# Patient Record
Sex: FEMALE | Race: WHITE | NOT HISPANIC OR LATINO | Employment: FULL TIME | URBAN - METROPOLITAN AREA
[De-identification: names, ages, dates, MRNs, and addresses within clinical notes are randomized per-mention and may not be internally consistent; named-entity substitution may affect disease eponyms.]

---

## 2018-05-01 ENCOUNTER — OFFICE VISIT (OUTPATIENT)
Dept: OBSTETRICS AND GYNECOLOGY | Facility: CLINIC | Age: 38
End: 2018-05-01

## 2018-05-01 VITALS
BODY MASS INDEX: 23.56 KG/M2 | WEIGHT: 138 LBS | DIASTOLIC BLOOD PRESSURE: 70 MMHG | SYSTOLIC BLOOD PRESSURE: 121 MMHG | HEART RATE: 80 BPM | HEIGHT: 64 IN

## 2018-05-01 DIAGNOSIS — Z01.419 WELL WOMAN EXAM WITH ROUTINE GYNECOLOGICAL EXAM: Primary | ICD-10-CM

## 2018-05-01 PROCEDURE — 99395 PREV VISIT EST AGE 18-39: CPT | Performed by: OBSTETRICS & GYNECOLOGY

## 2018-05-01 RX ORDER — SELENIUM SULFIDE 2.5 MG/100ML
LOTION TOPICAL
COMMUNITY
Start: 2017-12-08 | End: 2018-09-12

## 2018-05-01 RX ORDER — CICLOPIROX 1 G/100ML
SHAMPOO TOPICAL
COMMUNITY
Start: 2018-02-28 | End: 2018-09-12

## 2018-05-01 RX ORDER — LEVOTHYROXINE SODIUM 0.2 MG/1
200 TABLET ORAL
COMMUNITY
End: 2018-09-12

## 2018-05-01 RX ORDER — FLUOCINONIDE TOPICAL SOLUTION USP, 0.05% 0.5 MG/ML
SOLUTION TOPICAL
COMMUNITY
Start: 2018-02-06 | End: 2018-09-12

## 2018-05-01 RX ORDER — NORETHINDRONE ACETATE AND ETHINYL ESTRADIOL 1; .02 MG/1; MG/1
TABLET ORAL
Qty: 21 TABLET | Refills: 12 | Status: SHIPPED | OUTPATIENT
Start: 2018-05-01 | End: 2018-09-12

## 2018-05-01 NOTE — PROGRESS NOTES
Subjective   Danya Rocha is a 37 y.o. female   Chief Complaint   Patient presents with   • Annual Exam     last pap 16      History of Present Illness  Danya Rocha is a 37 y.o.  who presents for an annual examination.   Periods are getting shorter - heavy for 3 days, bleeds for a total of 6 days.  21 days between menses.  Used to be more (28 days).  She had an ultrasound and found a  Polyp.  Was placed on oral contraceptive pills.  Has not had hysteroscopy, D&C.  She stopped the pills because it was harder to remember.      Pap history:  Last pap: 2016 LSIL, HPV positive; no ODESSA noted  Prior abnormal paps: yes, denies h/o LEEP; has had colposcopy 3-4 times    STDs  Sexually active: yes  History of STDs: HPV  Contraception:  S/p BTL    History of physical abuse: no, History of sexual abuse: no and History of verbal/emotional abuse: no    Screening for BRCA-   Is patient's family history significant for any of the following?   no  For non-Ashkenazi Mu-ism women:   Two first-degree relatives with breast cancer, one of whom was diagnosed at age 50 or younger   A combination of three or more first or second-degree relatives with breast cancer regardless of age at diagnosis   A combination of both breast and ovarian cancer among first and second-degree relatives   A first-degree relative with bilateral breast cancer   A combination of two or more first or second degree relatives with ovarian cancer, regardless of age at diagnosis   A first or second-degree relative with both breast and ovarian cancer at any age   History of breast cancer in a male relative   For women of Ashkenazi Mu-ism descent:   Any first-degree relative (or two second degree relatives on the same side of the family) with breast or ovarian cancer   Recommendations from the [place] Preventive Services Task Force on who should be offered genetic testing for BRCA mutations*     Past Medical History:   Diagnosis Date   •  "Hypothyroid    • Lymphangioma      Past Surgical History:   Procedure Laterality Date   • BREAST SURGERY     • NECK SURGERY     • TONGUE SURGERY     • TUBAL ABDOMINAL LIGATION       Social History   Substance Use Topics   • Smoking status: Never Smoker   • Smokeless tobacco: Never Used   • Alcohol use Yes      Comment: social     Family History   Problem Relation Age of Onset   • Breast cancer Neg Hx    • Ovarian cancer Neg Hx    • Uterine cancer Neg Hx    • Colon cancer Neg Hx      Current Outpatient Prescriptions on File Prior to Visit   Medication Sig Dispense Refill   • SYNTHROID 175 MCG tablet        No current facility-administered medications on file prior to visit.      Allergies   Allergen Reactions   • Latex Irritability            Review of Systems   Constitutional: Negative for chills, fever and unexpected weight change.   HENT: Negative for congestion, nosebleeds and sore throat.    Eyes: Negative for visual disturbance.   Respiratory: Negative for cough, chest tightness and shortness of breath.    Cardiovascular: Negative for chest pain and palpitations.   Gastrointestinal: Negative for abdominal pain, constipation, diarrhea, nausea and vomiting.   Endocrine: Negative for polyuria.   Genitourinary: Negative for difficulty urinating, dyspareunia, dysuria, frequency, genital sores, hematuria, menstrual problem, pelvic pain, urgency, vaginal bleeding, vaginal discharge and vaginal pain.   Musculoskeletal: Negative for arthralgias and joint swelling.   Skin: Negative for color change and rash.   Neurological: Negative for dizziness, light-headedness and headaches.   Hematological: Does not bruise/bleed easily.   Psychiatric/Behavioral: Negative for dysphoric mood. The patient is not nervous/anxious.        Objective    /70   Pulse 80   Ht 162.6 cm (64.02\")   Wt 62.6 kg (138 lb)   LMP 04/12/2018 (Exact Date)   BMI 23.68 kg/m²    Physical Exam   Constitutional: She is oriented to person, place, " and time. She appears well-developed and well-nourished. No distress.   HENT:   Head: Normocephalic and atraumatic.   Neck: No tracheal deviation present. No thyromegaly present.   Cardiovascular: Normal rate, regular rhythm and normal heart sounds.  Exam reveals no gallop and no friction rub.    No murmur heard.  Pulmonary/Chest: Effort normal and breath sounds normal. No respiratory distress. She has no wheezes. She has no rales. She exhibits no tenderness. Right breast exhibits no inverted nipple, no mass, no nipple discharge, no skin change and no tenderness. Left breast exhibits no inverted nipple, no mass, no nipple discharge, no skin change and no tenderness.   Abdominal: Soft. She exhibits no distension and no mass. There is no tenderness.   Genitourinary: Uterus normal. No labial fusion. There is no rash, lesion or injury on the right labia. There is no rash, lesion or injury on the left labia. Uterus is not deviated, not enlarged, not fixed and not tender. Cervix exhibits no motion tenderness, no discharge and no friability. Right adnexum displays no mass, no tenderness and no fullness. Left adnexum displays no mass, no tenderness and no fullness. No tenderness or bleeding in the vagina. No vaginal discharge found.   Musculoskeletal: Normal range of motion. She exhibits no edema or deformity.   Lymphadenopathy:     She has no cervical adenopathy.   Neurological: She is alert and oriented to person, place, and time.   Skin: Skin is warm and dry. No rash noted. She is not diaphoretic.   Psychiatric: She has a normal mood and affect. Her behavior is normal. Judgment and thought content normal.         Assessment/Plan   Problems Addressed this Visit     None      Visit Diagnoses     Well woman exam with routine gynecological exam    -  Primary    Relevant Orders    IGP, Apt HPV,rfx 16 / 18,45 - ThinPrep Vial, Cervix        Well woman counseling/screening:    Cervical cancer screening:    Reports h/o cervical  dysplasia - most recent LSIL with negative colpo   The patient is due for a pap today.    Screening guidelines discussed with patient  Breast cancer screening:    Clinical breast exam recommended for age 20-39 years every 1-3 years   Mammogram recommended starting age 40, Discussed risks and benefits of earlier versus later screening   Breast self awareness encouraged  STD Screening   Declines  Contraception :   S/p BTL  Having frequent menses - q21 days- and would like to skip cycle while on vacation in June. She denies intermenstrual bleeding or heavy menses.  BP slightly elevated but this is not a diagnosis she has had in the past, repeat BP within normal limits.  No C/I to oral contraceptive pill use and has used in 2016 with Dr. Mitchell. Will try two cycles of Lo estren.  Discussed use for continuous oral contraceptive pill.    Family history    does not demonstrate need for genetics referral   Healthy lifestyle counseling:   Patient was counseled on    Body mass index is 23.68 kg/m².   AUB  Patient was counseled on possible etiologies including endometrial pathology (polyps, fibroids, hyperplasia, malignancy) as well as hormonal (ovulatory dysfunction, thyroid dysfunction, etc.)  We discussed risks of anemia if bleeding heavy and untreated, including syncope, dizziness, and lightheadedness.     For the work up of AUB, we will obtain: repeat ultrasound as she had one suggesting a polyp in the past.  She is interested in Mirena placement, signed paperwork today. Discussed ablation, however patient is worried about failure given age (only 37)

## 2018-05-08 ENCOUNTER — TELEPHONE (OUTPATIENT)
Dept: OBSTETRICS AND GYNECOLOGY | Facility: CLINIC | Age: 38
End: 2018-05-08

## 2018-05-08 LAB
CYTOLOGIST CVX/VAG CYTO: ABNORMAL
CYTOLOGY CVX/VAG DOC THIN PREP: ABNORMAL
DX ICD CODE: ABNORMAL
DX ICD CODE: ABNORMAL
HIV 1 & 2 AB SER-IMP: ABNORMAL
HPV I/H RISK 4 DNA CVX QL PROBE+SIG AMP: POSITIVE
HPV16 DNA CVX QL PROBE+SIG AMP: NEGATIVE
HPV18+45 E6+E7 MRNA CVX QL NAA+PROBE: NEGATIVE
OTHER STN SPEC: ABNORMAL
PATH REPORT.FINAL DX SPEC: ABNORMAL
PATHOLOGIST CVX/VAG CYTO: ABNORMAL
STAT OF ADQ CVX/VAG CYTO-IMP: ABNORMAL

## 2018-05-08 NOTE — TELEPHONE ENCOUNTER
Pap May 2018: LSIL, HR HPV +, 16/18 negative  Last pap: Sept 2016 LSIL, HPV positive; no ODESSA noted  Prior abnormal paps: yes, denies h/o LEEP; has had colposcopy 3-4 times    Please call patient and inform her of LSIL HPV + pap, recommend colposcopy. Thanks!

## 2018-05-09 NOTE — TELEPHONE ENCOUNTER
Called patient to inform results, no answer. Left a message to return call.     Patient return call, patient informed of results and scheduled a colpo appt for 5/23/18

## 2018-05-22 ENCOUNTER — PROCEDURE VISIT (OUTPATIENT)
Dept: OBSTETRICS AND GYNECOLOGY | Facility: CLINIC | Age: 38
End: 2018-05-22

## 2018-05-22 VITALS
WEIGHT: 138 LBS | HEART RATE: 79 BPM | HEIGHT: 64 IN | SYSTOLIC BLOOD PRESSURE: 127 MMHG | BODY MASS INDEX: 23.56 KG/M2 | DIASTOLIC BLOOD PRESSURE: 77 MMHG

## 2018-05-22 DIAGNOSIS — R87.612 LOW GRADE SQUAMOUS INTRAEPITHELIAL LESION ON CYTOLOGIC SMEAR OF CERVIX (LGSIL): Primary | ICD-10-CM

## 2018-05-22 LAB
B-HCG UR QL: NEGATIVE
INTERNAL NEGATIVE CONTROL: NEGATIVE
INTERNAL POSITIVE CONTROL: POSITIVE
Lab: NORMAL

## 2018-05-22 PROCEDURE — 57454 BX/CURETT OF CERVIX W/SCOPE: CPT | Performed by: OBSTETRICS & GYNECOLOGY

## 2018-05-22 PROCEDURE — 81025 URINE PREGNANCY TEST: CPT | Performed by: OBSTETRICS & GYNECOLOGY

## 2018-05-22 RX ORDER — CLOBETASOL PROPIONATE 0.46 MG/ML
SOLUTION TOPICAL
COMMUNITY
Start: 2018-05-03 | End: 2018-09-12

## 2018-05-25 LAB
DX ICD CODE: NORMAL
DX ICD CODE: NORMAL
PATH REPORT.FINAL DX SPEC: NORMAL
PATH REPORT.GROSS SPEC: NORMAL
PATH REPORT.SITE OF ORIGIN SPEC: NORMAL
PATHOLOGIST NAME: NORMAL
PAYMENT PROCEDURE: NORMAL

## 2018-05-29 ENCOUNTER — TELEPHONE (OUTPATIENT)
Dept: OBSTETRICS AND GYNECOLOGY | Facility: CLINIC | Age: 38
End: 2018-05-29

## 2018-05-29 NOTE — TELEPHONE ENCOUNTER
Called patient to review colposcopy results.  Discussed with patient that the colposcopy showed ODESSA 2 and would recommend excisional procedure such as LEEP or CKC.  Patient expressed understanding. She is going on a vacation the next two months but will call to come for an appointment on returning. Would recommend follow up in the next 6 to 8 weeks at longest. Patient expressed understanding.  She is considering LEEP/CKC in OR versus office.  Discussed procedure/risk/benefits.

## 2018-09-12 ENCOUNTER — CONSULT (OUTPATIENT)
Dept: OBSTETRICS AND GYNECOLOGY | Facility: CLINIC | Age: 38
End: 2018-09-12

## 2018-09-12 VITALS
BODY MASS INDEX: 24.24 KG/M2 | HEIGHT: 64 IN | SYSTOLIC BLOOD PRESSURE: 125 MMHG | WEIGHT: 142 LBS | HEART RATE: 87 BPM | DIASTOLIC BLOOD PRESSURE: 85 MMHG

## 2018-09-12 DIAGNOSIS — N87.1 DYSPLASIA OF CERVIX, HIGH GRADE CIN 2: Primary | ICD-10-CM

## 2018-09-12 DIAGNOSIS — R92.8 ABNORMAL FINDING ON BREAST IMAGING: ICD-10-CM

## 2018-09-12 PROCEDURE — 99214 OFFICE O/P EST MOD 30 MIN: CPT | Performed by: OBSTETRICS & GYNECOLOGY

## 2018-09-13 PROBLEM — N87.1 DYSPLASIA OF CERVIX, HIGH GRADE CIN 2: Status: ACTIVE | Noted: 2018-09-13

## 2018-09-13 NOTE — PROGRESS NOTES
Akanksha Rocha is a 37 y.o. female   Chief Complaint   Patient presents with   • Consult    CC:  ODESSA 2 on colposcopy with persistence of dysplasia. Recommend excisional procedure   Left breast pain    History of Present Illness  Patient presents for follow-up of cervical dysplasia.  She was unable to go through an excisional procedure secondary to travel plans over the summer.  She is not ready for an excisional procedure.  She additionally complains of some left-sided breast pain.  Patient has a complicated medical history with multiple surgeries to her right arm secondary to lymphedema.  She has had a mammogram in the past on the right due to pain that showed lymphedema.  She reports that the left breast pain is new, not associated with menstrual cycle, feels like a pulling sensation in the upper outer quadrant of her left breast.  She denies any nipple discharge, masses, erythema.  Does report global enlargement of left breast which she attributes to recent weight gain.  Past Medical History:   Diagnosis Date   • Hypothyroid    • Lymphangioma      Past Surgical History:   Procedure Laterality Date   • BREAST SURGERY     • NECK SURGERY     • TONGUE SURGERY     • TUBAL ABDOMINAL LIGATION       Social History   Substance Use Topics   • Smoking status: Never Smoker   • Smokeless tobacco: Never Used   • Alcohol use Yes      Comment: social     Family History   Problem Relation Age of Onset   • Breast cancer Neg Hx    • Ovarian cancer Neg Hx    • Uterine cancer Neg Hx    • Colon cancer Neg Hx      Review of Systems   Constitutional: Negative for chills, fever and unexpected weight change.   HENT: Negative for congestion, nosebleeds and sore throat.    Eyes: Negative for visual disturbance.   Respiratory: Negative for cough, chest tightness and shortness of breath.    Cardiovascular: Negative for chest pain and palpitations.   Gastrointestinal: Negative for abdominal pain, constipation, diarrhea, nausea  "and vomiting.   Endocrine: Negative for polyuria.   Genitourinary: Negative for difficulty urinating, dyspareunia, dysuria, frequency, genital sores, hematuria, menstrual problem, pelvic pain, urgency, vaginal bleeding, vaginal discharge and vaginal pain.   Musculoskeletal: Negative for arthralgias and joint swelling.        Breast pain +   Skin: Negative for color change and rash.   Neurological: Negative for dizziness, light-headedness and headaches.   Hematological: Does not bruise/bleed easily.   Psychiatric/Behavioral: Negative for dysphoric mood. The patient is not nervous/anxious.        Objective    /85   Pulse 87   Ht 162.6 cm (64.02\")   Wt 64.4 kg (142 lb)   BMI 24.36 kg/m²    Physical Exam   Constitutional: She is oriented to person, place, and time. She appears well-developed and well-nourished. No distress.   HENT:   Head: Normocephalic and atraumatic.   Eyes: EOM are normal. No scleral icterus.   Pulmonary/Chest: Effort normal and breath sounds normal. Right breast exhibits skin change (from prior surgery). Right breast exhibits no inverted nipple, no mass, no nipple discharge and no tenderness. Left breast exhibits no inverted nipple, no mass, no nipple discharge, no skin change and no tenderness.       Left breast > right breast   Abdominal: There is no tenderness.   Neurological: She is alert and oriented to person, place, and time.   Skin: Skin is warm and dry. She is not diaphoretic.   Psychiatric: She has a normal mood and affect. Her behavior is normal. Judgment and thought content normal.         Assessment/Plan   Danya was seen today for consult.    Diagnoses and all orders for this visit:    Dysplasia of cervix, high grade ODESSA 2  -     Mammo Diagnostic Left With CAD  -     Case Request; Standing  -     CBC and Differential; Future  -     Case Request    Other orders  -     Follow Anesthesia Guidelines / Standing Orders; Future  -     Follow Anesthesia Guidelines / Standing " Orders; Standing  -     Obtain informed consent; Standing    Options for treatment were discussed with patient regarding management of cervical dysplasia including expectant management, excision, surveillance.  Given persistence of disease, patient would like excisional procedure.  We discussed options of loop electrosurgical excision procedure versus cold knife conization.  Patient would like to to this procedure in the operating room secondary to her concern for inability to tolerate in the office.  We discussed that Clomid conization has better margins for pathologic exam and patient would prefer to proceed with this.  As noted in exam note from May 1, patient has been having shorter menstrual cycles and is considering definitive management as she felt birth control pills.  She had an ultrasound on September 26, 2016 that showed tissue consistent with a possible polyp.  We discussed that during this procedure, we could proceed with hysteroscopy, D&C to investigate if the polyp is still there given her bleeding changes and to get a uterine sample to rule out hyperplasia.    She is understanding that this procedure involves removal of a portion of the cervix and the risks include but are not limited to risks of anesthesia, injury to surrounding structures, infection, bleeding, uterine perforation, inability to diagnose, need for other surgeries/procedures.    She was verbally consented for the procedure and had all questions answered to her apparent satisfaction upon completion of the discussion.    If she were to need blood or a blood transfusion she is accepting of this.    For the breast pain, we will proceed with diagnostic mammography given the noncyclic nature and new onset.

## 2018-09-17 ENCOUNTER — RESULTS ENCOUNTER (OUTPATIENT)
Dept: OBSTETRICS AND GYNECOLOGY | Facility: CLINIC | Age: 38
End: 2018-09-17

## 2018-09-17 DIAGNOSIS — N87.1 DYSPLASIA OF CERVIX, HIGH GRADE CIN 2: ICD-10-CM

## 2018-09-18 ENCOUNTER — RESULTS ENCOUNTER (OUTPATIENT)
Dept: OBSTETRICS AND GYNECOLOGY | Facility: CLINIC | Age: 38
End: 2018-09-18

## 2018-09-18 DIAGNOSIS — N87.1 DYSPLASIA OF CERVIX, HIGH GRADE CIN 2: ICD-10-CM

## 2018-09-20 ENCOUNTER — APPOINTMENT (OUTPATIENT)
Dept: WOMENS IMAGING | Facility: HOSPITAL | Age: 38
End: 2018-09-20

## 2018-09-20 PROCEDURE — 77066 DX MAMMO INCL CAD BI: CPT | Performed by: RADIOLOGY

## 2018-09-20 PROCEDURE — G0279 TOMOSYNTHESIS, MAMMO: HCPCS | Performed by: RADIOLOGY

## 2018-09-20 PROCEDURE — 76641 ULTRASOUND BREAST COMPLETE: CPT | Performed by: RADIOLOGY

## 2018-09-20 PROCEDURE — 77062 BREAST TOMOSYNTHESIS BI: CPT | Performed by: RADIOLOGY

## 2018-09-21 ENCOUNTER — TELEPHONE (OUTPATIENT)
Dept: OBSTETRICS AND GYNECOLOGY | Facility: CLINIC | Age: 38
End: 2018-09-21

## 2018-09-21 DIAGNOSIS — R92.8 ABNORMAL MAMMOGRAM: Primary | ICD-10-CM

## 2018-09-21 NOTE — TELEPHONE ENCOUNTER
I cannot see the order to sign it so I placed a new order    [facility]-     C called and said they did a dx ramón mammo and L breast u/s on this patient but they saw something in the R breast as well and want to do an ultrasound. I put the order in and pended it if you could sign off. Thank you!    Dar

## 2018-11-01 ENCOUNTER — APPOINTMENT (OUTPATIENT)
Dept: PREADMISSION TESTING | Facility: HOSPITAL | Age: 38
End: 2018-11-01

## 2018-11-01 VITALS
OXYGEN SATURATION: 99 % | RESPIRATION RATE: 18 BRPM | HEIGHT: 64 IN | WEIGHT: 142 LBS | TEMPERATURE: 99.3 F | HEART RATE: 92 BPM | DIASTOLIC BLOOD PRESSURE: 85 MMHG | SYSTOLIC BLOOD PRESSURE: 123 MMHG | BODY MASS INDEX: 24.24 KG/M2

## 2018-11-01 LAB
ANION GAP SERPL CALCULATED.3IONS-SCNC: 10.5 MMOL/L
BASOPHILS # BLD AUTO: 0.02 10*3/MM3 (ref 0–0.2)
BASOPHILS NFR BLD AUTO: 0.3 % (ref 0–1.5)
BUN BLD-MCNC: 9 MG/DL (ref 6–20)
BUN/CREAT SERPL: 13.2 (ref 7–25)
CALCIUM SPEC-SCNC: 9.2 MG/DL (ref 8.6–10.5)
CHLORIDE SERPL-SCNC: 100 MMOL/L (ref 98–107)
CO2 SERPL-SCNC: 27.5 MMOL/L (ref 22–29)
CREAT BLD-MCNC: 0.68 MG/DL (ref 0.57–1)
DEPRECATED RDW RBC AUTO: 38 FL (ref 37–54)
EOSINOPHIL # BLD AUTO: 0.11 10*3/MM3 (ref 0–0.7)
EOSINOPHIL NFR BLD AUTO: 1.9 % (ref 0.3–6.2)
ERYTHROCYTE [DISTWIDTH] IN BLOOD BY AUTOMATED COUNT: 11.9 % (ref 11.7–13)
GFR SERPL CREATININE-BSD FRML MDRD: 96 ML/MIN/1.73
GLUCOSE BLD-MCNC: 115 MG/DL (ref 65–99)
HCG SERPL QL: NEGATIVE
HCT VFR BLD AUTO: 40.7 % (ref 35.6–45.5)
HGB BLD-MCNC: 13.8 G/DL (ref 11.9–15.5)
IMM GRANULOCYTES # BLD: 0.01 10*3/MM3 (ref 0–0.03)
IMM GRANULOCYTES NFR BLD: 0.2 % (ref 0–0.5)
LYMPHOCYTES # BLD AUTO: 0.95 10*3/MM3 (ref 0.9–4.8)
LYMPHOCYTES NFR BLD AUTO: 16.2 % (ref 19.6–45.3)
MCH RBC QN AUTO: 29.7 PG (ref 26.9–32)
MCHC RBC AUTO-ENTMCNC: 33.9 G/DL (ref 32.4–36.3)
MCV RBC AUTO: 87.5 FL (ref 80.5–98.2)
MONOCYTES # BLD AUTO: 0.62 10*3/MM3 (ref 0.2–1.2)
MONOCYTES NFR BLD AUTO: 10.6 % (ref 5–12)
NEUTROPHILS # BLD AUTO: 4.16 10*3/MM3 (ref 1.9–8.1)
NEUTROPHILS NFR BLD AUTO: 71 % (ref 42.7–76)
PLATELET # BLD AUTO: 276 10*3/MM3 (ref 140–500)
PMV BLD AUTO: 9.8 FL (ref 6–12)
POTASSIUM BLD-SCNC: 4.5 MMOL/L (ref 3.5–5.2)
RBC # BLD AUTO: 4.65 10*6/MM3 (ref 3.9–5.2)
SODIUM BLD-SCNC: 138 MMOL/L (ref 136–145)
WBC NRBC COR # BLD: 5.86 10*3/MM3 (ref 4.5–10.7)

## 2018-11-01 PROCEDURE — 85025 COMPLETE CBC W/AUTO DIFF WBC: CPT | Performed by: OBSTETRICS & GYNECOLOGY

## 2018-11-01 PROCEDURE — 80048 BASIC METABOLIC PNL TOTAL CA: CPT | Performed by: OBSTETRICS & GYNECOLOGY

## 2018-11-01 PROCEDURE — 84703 CHORIONIC GONADOTROPIN ASSAY: CPT | Performed by: OBSTETRICS & GYNECOLOGY

## 2018-11-01 PROCEDURE — 36415 COLL VENOUS BLD VENIPUNCTURE: CPT | Performed by: OBSTETRICS & GYNECOLOGY

## 2018-11-01 NOTE — DISCHARGE INSTRUCTIONS
Take the following medications the morning of surgery with a small sip of water: NONE    ARRIVE  AM    General Instructions:  • Do not eat solid food after midnight the night before surgery.  • You may drink clear liquids day of surgery but must stop at least one hour before your hospital arrival time.  • It is beneficial for you to have a clear drink that contains carbohydrates the day of surgery.  We suggest a 12 to 20 ounce bottle of Gatorade or Powerade for non-diabetic patients or a 12 to 20 ounce bottle of G2 or Powerade Zero for diabetic patients. (Pediatric patients, are not advised to drink a 12 to 20 ounce carbohydrate drink)    Clear liquids are liquids you can see through.  Nothing red in color.     Plain water                               Sports drinks  Sodas                                   Gelatin (Jell-O)  Fruit juices without pulp such as white grape juice and apple juice  Popsicles that contain no fruit or yogurt  Tea or coffee (no cream or milk added)  Gatorade / Powerade  G2 / Powerade Zero    • Infants may have breast milk up to four hours before surgery.  • Infants drinking formula may drink formula up to six hours before surgery.   • Patients who avoid smoking, chewing tobacco and alcohol for 4 weeks prior to surgery have a reduced risk of post-operative complications.  Quit smoking as many days before surgery as you can.  • Do not smoke, use chewing tobacco or drink alcohol the day of surgery.   • If applicable bring your C-PAP/ BI-PAP machine.  • Bring any papers given to you in the doctor’s office.  • Wear clean comfortable clothes and socks.  • Do not wear contact lenses or make-up.  Bring a case for your glasses.   • Bring crutches or walker if applicable.  • Remove all piercings.  Leave jewelry and any other valuables at home.  • Hair extensions with metal clips must be removed prior to surgery.  • The Pre-Admission Testing nurse will instruct you to bring medications if unable  to obtain an accurate list in Pre-Admission Testing.        If you were given a blood bank ID arm band remember to bring it with you the day of surgery.    Preventing a Surgical Site Infection:  • For 2 to 3 days before surgery, avoid shaving with a razor because the razor can irritate skin and make it easier to develop an infection.    • Any areas of open skin can increase the risk of a post-operative wound infection by allowing bacteria to enter and travel throughout the body.  Notify your surgeon if you have any skin wounds / rashes even if it is not near the expected surgical site.  The area will need assessed to determine if surgery should be delayed until it is healed.  • The night prior to surgery sleep in a clean bed with clean clothing.  Do not allow pets to sleep with you.  • Shower on the morning of surgery using a fresh bar of anti-bacterial soap (such as Dial) and clean washcloth.  Dry with a clean towel and dress in clean clothing.  • Ask your surgeon if you will be receiving antibiotics prior to surgery.  • Make sure you, your family, and all healthcare providers clean their hands with soap and water or an alcohol based hand  before caring for you or your wound.    Day of surgery:  Upon arrival, a Pre-op nurse and Anesthesiologist will review your health history, obtain vital signs, and answer questions you may have.  The only belongings needed at this time will be your home medications and if applicable your C-PAP/BI-PAP machine.  If you are staying overnight your family can leave the rest of your belongings in the car and bring them to your room later.  A Pre-op nurse will start an IV and you may receive medication in preparation for surgery, including something to help you relax.  Your family will be able to see you in the Pre-op area.  While you are in surgery your family should notify the waiting room  if they leave the waiting room area and provide a contact phone  number.    Please be aware that surgery does come with discomfort.  We want to make every effort to control your discomfort so please discuss any uncontrolled symptoms with your nurse.   Your doctor will most likely have prescribed pain medications.      If you are going home after surgery you will receive individualized written care instructions before being discharged.  A responsible adult must drive you to and from the hospital on the day of your surgery and stay with you for 24 hours.    If you are staying overnight following surgery, you will be transported to your hospital room following the recovery period.  [facility] has all private rooms.    You have received a list of surgical assistants for your reference.  If you have any questions please call Pre-Admission Testing at [phone].  Deductibles and co-payments are collected on the day of service. Please be prepared to pay the required co-pay, deductible or deposit on the day of service as defined by your plan.

## 2018-11-06 ENCOUNTER — ANESTHESIA EVENT (OUTPATIENT)
Dept: PERIOP | Facility: HOSPITAL | Age: 38
End: 2018-11-06

## 2018-11-07 ENCOUNTER — TELEPHONE (OUTPATIENT)
Dept: OBSTETRICS AND GYNECOLOGY | Facility: CLINIC | Age: 38
End: 2018-11-07

## 2018-11-07 ENCOUNTER — HOSPITAL ENCOUNTER (OUTPATIENT)
Facility: HOSPITAL | Age: 38
Setting detail: HOSPITAL OUTPATIENT SURGERY
Discharge: HOME | End: 2018-11-07
Attending: OBSTETRICS & GYNECOLOGY | Admitting: OBSTETRICS & GYNECOLOGY

## 2018-11-07 ENCOUNTER — ANESTHESIA (OUTPATIENT)
Dept: PERIOP | Facility: HOSPITAL | Age: 38
End: 2018-11-07

## 2018-11-07 VITALS
HEART RATE: 80 BPM | DIASTOLIC BLOOD PRESSURE: 82 MMHG | SYSTOLIC BLOOD PRESSURE: 114 MMHG | RESPIRATION RATE: 18 BRPM | TEMPERATURE: 98 F | OXYGEN SATURATION: 97 %

## 2018-11-07 DIAGNOSIS — N87.1 DYSPLASIA OF CERVIX, HIGH GRADE CIN 2: ICD-10-CM

## 2018-11-07 PROCEDURE — 25010000002 ONDANSETRON PER 1 MG: Performed by: NURSE ANESTHETIST, CERTIFIED REGISTERED

## 2018-11-07 PROCEDURE — 88305 TISSUE EXAM BY PATHOLOGIST: CPT | Performed by: OBSTETRICS & GYNECOLOGY

## 2018-11-07 PROCEDURE — 58558 HYSTEROSCOPY BIOPSY: CPT | Performed by: OBSTETRICS & GYNECOLOGY

## 2018-11-07 PROCEDURE — 25010000002 DEXAMETHASONE PER 1 MG: Performed by: NURSE ANESTHETIST, CERTIFIED REGISTERED

## 2018-11-07 PROCEDURE — C1782 MORCELLATOR: HCPCS | Performed by: OBSTETRICS & GYNECOLOGY

## 2018-11-07 PROCEDURE — 25010000002 FENTANYL CITRATE (PF) 100 MCG/2ML SOLUTION: Performed by: NURSE ANESTHETIST, CERTIFIED REGISTERED

## 2018-11-07 PROCEDURE — 25010000002 PROPOFOL 10 MG/ML EMULSION: Performed by: NURSE ANESTHETIST, CERTIFIED REGISTERED

## 2018-11-07 PROCEDURE — 88307 TISSUE EXAM BY PATHOLOGIST: CPT | Performed by: OBSTETRICS & GYNECOLOGY

## 2018-11-07 PROCEDURE — 57520 CONIZATION OF CERVIX: CPT | Performed by: OBSTETRICS & GYNECOLOGY

## 2018-11-07 PROCEDURE — 25010000002 MIDAZOLAM PER 1 MG: Performed by: ANESTHESIOLOGY

## 2018-11-07 PROCEDURE — 25010000002 KETOROLAC TROMETHAMINE PER 15 MG: Performed by: NURSE ANESTHETIST, CERTIFIED REGISTERED

## 2018-11-07 RX ORDER — ONDANSETRON 2 MG/ML
INJECTION INTRAMUSCULAR; INTRAVENOUS AS NEEDED
Status: DISCONTINUED | OUTPATIENT
Start: 2018-11-07 | End: 2018-11-07 | Stop reason: SURG

## 2018-11-07 RX ORDER — FENTANYL CITRATE 50 UG/ML
50 INJECTION, SOLUTION INTRAMUSCULAR; INTRAVENOUS
Status: DISCONTINUED | OUTPATIENT
Start: 2018-11-07 | End: 2018-11-07 | Stop reason: HOSPADM

## 2018-11-07 RX ORDER — DEXAMETHASONE SODIUM PHOSPHATE 10 MG/ML
INJECTION INTRAMUSCULAR; INTRAVENOUS AS NEEDED
Status: DISCONTINUED | OUTPATIENT
Start: 2018-11-07 | End: 2018-11-07 | Stop reason: SURG

## 2018-11-07 RX ORDER — PROMETHAZINE HYDROCHLORIDE 25 MG/1
25 SUPPOSITORY RECTAL ONCE AS NEEDED
Status: DISCONTINUED | OUTPATIENT
Start: 2018-11-07 | End: 2018-11-07 | Stop reason: HOSPADM

## 2018-11-07 RX ORDER — PROMETHAZINE HYDROCHLORIDE 25 MG/ML
12.5 INJECTION, SOLUTION INTRAMUSCULAR; INTRAVENOUS ONCE AS NEEDED
Status: DISCONTINUED | OUTPATIENT
Start: 2018-11-07 | End: 2018-11-07 | Stop reason: HOSPADM

## 2018-11-07 RX ORDER — OXYCODONE HYDROCHLORIDE AND ACETAMINOPHEN 5; 325 MG/1; MG/1
1-2 TABLET ORAL EVERY 4 HOURS PRN
Qty: 10 TABLET | Refills: 0 | Status: SHIPPED | OUTPATIENT
Start: 2018-11-07 | End: 2018-12-04

## 2018-11-07 RX ORDER — PROMETHAZINE HYDROCHLORIDE 25 MG/1
12.5 TABLET ORAL ONCE AS NEEDED
Status: DISCONTINUED | OUTPATIENT
Start: 2018-11-07 | End: 2018-11-07 | Stop reason: HOSPADM

## 2018-11-07 RX ORDER — ONDANSETRON 2 MG/ML
4 INJECTION INTRAMUSCULAR; INTRAVENOUS ONCE AS NEEDED
Status: DISCONTINUED | OUTPATIENT
Start: 2018-11-07 | End: 2018-11-07 | Stop reason: HOSPADM

## 2018-11-07 RX ORDER — HYDROCODONE BITARTRATE AND ACETAMINOPHEN 7.5; 325 MG/1; MG/1
1 TABLET ORAL ONCE AS NEEDED
Status: DISCONTINUED | OUTPATIENT
Start: 2018-11-07 | End: 2018-11-07 | Stop reason: HOSPADM

## 2018-11-07 RX ORDER — SODIUM CHLORIDE, SODIUM LACTATE, POTASSIUM CHLORIDE, CALCIUM CHLORIDE 600; 310; 30; 20 MG/100ML; MG/100ML; MG/100ML; MG/100ML
9 INJECTION, SOLUTION INTRAVENOUS CONTINUOUS
Status: DISCONTINUED | OUTPATIENT
Start: 2018-11-07 | End: 2018-11-07 | Stop reason: HOSPADM

## 2018-11-07 RX ORDER — MIDAZOLAM HYDROCHLORIDE 1 MG/ML
2 INJECTION INTRAMUSCULAR; INTRAVENOUS
Status: DISCONTINUED | OUTPATIENT
Start: 2018-11-07 | End: 2018-11-07 | Stop reason: HOSPADM

## 2018-11-07 RX ORDER — SODIUM CHLORIDE 0.9 % (FLUSH) 0.9 %
1-10 SYRINGE (ML) INJECTION AS NEEDED
Status: DISCONTINUED | OUTPATIENT
Start: 2018-11-07 | End: 2018-11-07 | Stop reason: HOSPADM

## 2018-11-07 RX ORDER — IODINE/POTASSIUM IODIDE 7 %
TINCTURE MISCELLANEOUS AS NEEDED
Status: DISCONTINUED | OUTPATIENT
Start: 2018-11-07 | End: 2018-11-07 | Stop reason: HOSPADM

## 2018-11-07 RX ORDER — KETOROLAC TROMETHAMINE 30 MG/ML
INJECTION, SOLUTION INTRAMUSCULAR; INTRAVENOUS AS NEEDED
Status: DISCONTINUED | OUTPATIENT
Start: 2018-11-07 | End: 2018-11-07 | Stop reason: SURG

## 2018-11-07 RX ORDER — EPHEDRINE SULFATE 50 MG/ML
5 INJECTION, SOLUTION INTRAVENOUS ONCE AS NEEDED
Status: DISCONTINUED | OUTPATIENT
Start: 2018-11-07 | End: 2018-11-07 | Stop reason: HOSPADM

## 2018-11-07 RX ORDER — LIDOCAINE HYDROCHLORIDE 20 MG/ML
INJECTION, SOLUTION INFILTRATION; PERINEURAL AS NEEDED
Status: DISCONTINUED | OUTPATIENT
Start: 2018-11-07 | End: 2018-11-07 | Stop reason: SURG

## 2018-11-07 RX ORDER — PROMETHAZINE HYDROCHLORIDE 25 MG/1
25 TABLET ORAL ONCE AS NEEDED
Status: DISCONTINUED | OUTPATIENT
Start: 2018-11-07 | End: 2018-11-07 | Stop reason: HOSPADM

## 2018-11-07 RX ORDER — LIDOCAINE HYDROCHLORIDE 10 MG/ML
0.5 INJECTION, SOLUTION EPIDURAL; INFILTRATION; INTRACAUDAL; PERINEURAL ONCE AS NEEDED
Status: DISCONTINUED | OUTPATIENT
Start: 2018-11-07 | End: 2018-11-07 | Stop reason: HOSPADM

## 2018-11-07 RX ORDER — IBUPROFEN 600 MG/1
600 TABLET ORAL EVERY 6 HOURS PRN
Qty: 30 TABLET | Refills: 1 | Status: SHIPPED | OUTPATIENT
Start: 2018-11-07 | End: 2018-12-04

## 2018-11-07 RX ORDER — MAGNESIUM HYDROXIDE 1200 MG/15ML
LIQUID ORAL AS NEEDED
Status: DISCONTINUED | OUTPATIENT
Start: 2018-11-07 | End: 2018-11-07 | Stop reason: HOSPADM

## 2018-11-07 RX ORDER — MIDAZOLAM HYDROCHLORIDE 1 MG/ML
1 INJECTION INTRAMUSCULAR; INTRAVENOUS
Status: DISCONTINUED | OUTPATIENT
Start: 2018-11-07 | End: 2018-11-07 | Stop reason: HOSPADM

## 2018-11-07 RX ORDER — FLUMAZENIL 0.1 MG/ML
0.2 INJECTION INTRAVENOUS AS NEEDED
Status: DISCONTINUED | OUTPATIENT
Start: 2018-11-07 | End: 2018-11-07 | Stop reason: HOSPADM

## 2018-11-07 RX ORDER — LABETALOL HYDROCHLORIDE 5 MG/ML
5 INJECTION, SOLUTION INTRAVENOUS
Status: DISCONTINUED | OUTPATIENT
Start: 2018-11-07 | End: 2018-11-07 | Stop reason: HOSPADM

## 2018-11-07 RX ORDER — FENTANYL CITRATE 50 UG/ML
INJECTION, SOLUTION INTRAMUSCULAR; INTRAVENOUS AS NEEDED
Status: DISCONTINUED | OUTPATIENT
Start: 2018-11-07 | End: 2018-11-07 | Stop reason: SURG

## 2018-11-07 RX ORDER — FAMOTIDINE 10 MG/ML
20 INJECTION, SOLUTION INTRAVENOUS ONCE
Status: COMPLETED | OUTPATIENT
Start: 2018-11-07 | End: 2018-11-07

## 2018-11-07 RX ORDER — PROPOFOL 10 MG/ML
VIAL (ML) INTRAVENOUS AS NEEDED
Status: DISCONTINUED | OUTPATIENT
Start: 2018-11-07 | End: 2018-11-07 | Stop reason: SURG

## 2018-11-07 RX ORDER — OXYCODONE AND ACETAMINOPHEN 7.5; 325 MG/1; MG/1
1 TABLET ORAL ONCE AS NEEDED
Status: DISCONTINUED | OUTPATIENT
Start: 2018-11-07 | End: 2018-11-07 | Stop reason: HOSPADM

## 2018-11-07 RX ORDER — NALOXONE HCL 0.4 MG/ML
0.2 VIAL (ML) INJECTION AS NEEDED
Status: DISCONTINUED | OUTPATIENT
Start: 2018-11-07 | End: 2018-11-07 | Stop reason: HOSPADM

## 2018-11-07 RX ADMIN — FAMOTIDINE 20 MG: 10 INJECTION, SOLUTION INTRAVENOUS at 08:47

## 2018-11-07 RX ADMIN — MIDAZOLAM 2 MG: 1 INJECTION INTRAMUSCULAR; INTRAVENOUS at 08:46

## 2018-11-07 RX ADMIN — LIDOCAINE HYDROCHLORIDE 100 MG: 20 INJECTION, SOLUTION INFILTRATION; PERINEURAL at 10:54

## 2018-11-07 RX ADMIN — SODIUM CHLORIDE, POTASSIUM CHLORIDE, SODIUM LACTATE AND CALCIUM CHLORIDE 9 ML/HR: 600; 310; 30; 20 INJECTION, SOLUTION INTRAVENOUS at 08:46

## 2018-11-07 RX ADMIN — SODIUM CHLORIDE, POTASSIUM CHLORIDE, SODIUM LACTATE AND CALCIUM CHLORIDE: 600; 310; 30; 20 INJECTION, SOLUTION INTRAVENOUS at 11:56

## 2018-11-07 RX ADMIN — DEXAMETHASONE SODIUM PHOSPHATE 4 MG: 10 INJECTION INTRAMUSCULAR; INTRAVENOUS at 11:01

## 2018-11-07 RX ADMIN — KETOROLAC TROMETHAMINE 30 MG: 30 INJECTION, SOLUTION INTRAMUSCULAR; INTRAVENOUS at 11:01

## 2018-11-07 RX ADMIN — FENTANYL CITRATE 50 MCG: 50 INJECTION INTRAMUSCULAR; INTRAVENOUS at 11:00

## 2018-11-07 RX ADMIN — FENTANYL CITRATE 50 MCG: 50 INJECTION INTRAMUSCULAR; INTRAVENOUS at 11:57

## 2018-11-07 RX ADMIN — ONDANSETRON 4 MG: 2 INJECTION INTRAMUSCULAR; INTRAVENOUS at 11:01

## 2018-11-07 RX ADMIN — PROPOFOL 200 MG: 10 INJECTION, EMULSION INTRAVENOUS at 10:54

## 2018-11-07 NOTE — ANESTHESIA POSTPROCEDURE EVALUATION
Patient: Danya Frost    Procedure Summary     Date:  11/07/18 Room / Location:  [facility] OR [address] [place] [address] MAIN OR    Anesthesia Start:  1048 Anesthesia Stop:  1202    Procedures:       CERVICAL CONIZATION (N/A Cervix)      DILATATION AND CURETTAGE HYSTEROSCOPY WITH Myosure (N/A Uterus) Diagnosis:       Dysplasia of cervix, high grade ODESSA 2      (Dysplasia of cervix, high grade ODESSA 2 [N87.1])    Surgeon:  Tamika Escalona MD Provider:  Ariela Estrella MD    Anesthesia Type:  general ASA Status:  2          Anesthesia Type: general  Last vitals  BP   114/82 (11/07/18 1345)   Temp   36.7 °C (98 °F) (11/07/18 1300)   Pulse   80 (11/07/18 1345)   Resp   18 (11/07/18 1345)     SpO2   97 % (11/07/18 1345)     Post Anesthesia Care and Evaluation    Patient location during evaluation: bedside  Patient participation: complete - patient participated  Level of consciousness: awake  Pain score: 2  Pain management: adequate  Airway patency: patent  Anesthetic complications: No anesthetic complications  PONV Status: none  Cardiovascular status: acceptable  Respiratory status: acceptable  Hydration status: acceptable    Comments: /82   Pulse 80   Temp 36.7 °C (98 °F) (Oral)   Resp 18   LMP 10/21/2018   SpO2 97%

## 2018-11-07 NOTE — TELEPHONE ENCOUNTER
Spoke to [facility]. They said the report from her initial DX mammo is not complete and wont be completed until after she has her R breast u/s scheduled on 11/12. Dr. Escalona is informed and I will follow up on this.      Dar

## 2018-11-07 NOTE — ANESTHESIA PREPROCEDURE EVALUATION
Anesthesia Evaluation     Patient summary reviewed   history of anesthetic complications: difficult airway  NPO Solid Status: > 8 hours  NPO Liquid Status: > 8 hours           Airway   Mallampati: IV  TM distance: >3 FB  Difficult intubation highly probable and Small opening  Dental      Pulmonary    Cardiovascular     Rhythm: regular  Rate: normal        Neuro/Psych  GI/Hepatic/Renal/Endo    (+)   hypothyroidism,     Musculoskeletal     Abdominal    Substance History      OB/GYN          Other        ROS/Med Hx Other: Lymphangioma.      Phys Exam Other: Patient has significantly limited mouth opening secondary to mandibular problems and surgery.  Larynx possibly deviated to the left.                 Anesthesia Plan    ASA 2     general     intravenous induction   Anesthetic plan, all risks, benefits, and alternatives have been provided, discussed and informed consent has been obtained with: patient.

## 2018-11-07 NOTE — ANESTHESIA PROCEDURE NOTES
Airway  Urgency: elective    Date/Time: 11/7/2018 10:56 AM  Airway not difficult    General Information and Staff    Patient location during procedure: OR  CRNA: JOSELYN MURPHY    Indications and Patient Condition  Indications for airway management: airway protection    Preoxygenated: yes  Mask difficulty assessment: 1 - vent by mask    Final Airway Details  Final airway type: supraglottic airway      Successful airway: unique  Size 4    Number of attempts at approach: 1    Additional Comments  Pre 02 100%, SIVI, DL x1, atraumatic intubation, BLBS, Positive ETC02.

## 2018-11-07 NOTE — TELEPHONE ENCOUNTER
----- Message from Tamika Escalona MD sent at 11/6/2018 10:25 PM EST -----  Dar Ingram -   I was looking in this patient's chart for her surgery 11/7/18, and realized I never saw her mammogram reports - can you see if you can find them in the chart?  If not, can we get them? Thanks!

## 2018-11-07 NOTE — OP NOTE
PREOPERATIVE DIAGNOSIS: Cervical dysplasia, AUB - polyp    POSTOPERATIVE DIAGNOSIS: Same    PROCEDURE: Cervical cone biopsy, endocervical curettage    ATTENDING SURGEON: Tamika Escalona MD     ANESTHESIA: General    BLOOD LOSS: 10 mL    FLUID DEFICIT: 100mL normal saline    COMPLICATIONS: None    COUNTS: Sponge, instrument and needle counts correct    DISPOSITION: PACU stable    SPECIMENS: cervical cone with stitch on anterior lip, ECC, endometrial polyp, endometrial curettings    FINDINGS: decreased Lugol's uptake immediately surrounding cervix, polyp on anterior aspect of uterus.  Otherwise, normal uterine cavity, normal bilateral tubal ostia    INDICATIONS FOR PROCEDURE    Ms. Danya Frost is a 37 y.o. 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  She was also noted to have complaints of abnormal bleeding and an ultrasound done previously suspicious for a polyp.  We discussed management of these symptoms and the patient opted for surgical management with cold knife conization, hysteroscopy, D&C.  Appropriate consents were obtained.    DESCRIPTION OF PROCEDURE    The patient was taken to the operating room where endotracheal anesthesia was administered. She was positioned in lithotomy in candycane stirrups and her perineum and vagina were prepped and draped in usual sterile fashion.  Her cervix was inspected; Lugol's was placed, and 10 mL of vasopressin was injected in the stroma of the cervix. Stay sutures at 3 and 9 o'clock position were placed. Moderate descensus of the cervix was noted.  A cone-shaped specimen was removed with the scalpel. An endocervical curettage was performed.  The cervix was then dilated to accommodate the operative hysteroscope.  The uterus sounded to 9 cm.  The hysteroscope was passed into the uterine cavity and the aforementioned findings were noted.  The polyp was removed with the Myosure Reach and a 360 degree curettage under direct  visualization was performed.  A gentle sharp curettage was also performed and those curettings were sent as a separate specimen.  The tenaculum was removed from there cervix and the conization was inspteced.  Hemostasis was achieved with cautery. Monsel's solution was placed. Surgicel was placed into the cervical bed and the stay sutures were tied. Good hemostasis was noted.  The patient was positioned supine and was then awakened and transferred to the recovery room in satisfactory condition.

## 2018-11-07 NOTE — H&P
Preoperative History and Physical    Chief complaint cervical dysplasia, AUB    Subjective     History of Present Illness:  Patient is a 37 y.o. female presents for cold knife conization and D&C, possible hysteroscopy due to cervical dysplasia, AUB.  She denies any recent illness including fever/chills.     Family History   Problem Relation Age of Onset   • Breast cancer Neg Hx    • Ovarian cancer Neg Hx    • Uterine cancer Neg Hx    • Colon cancer Neg Hx    • Malig Hyperthermia Neg Hx      Social History   Substance Use Topics   • Smoking status: Never Smoker   • Smokeless tobacco: Never Used   • Alcohol use Yes      Comment: social     Past Medical History:   Diagnosis Date   • Hard to intubate    • History of MRSA infection 2002    RIGHT BREAST AFTER BREAST IMPLANTS. TREATED AT [facility]   • Hypothyroid    • Lymphangioma      Past Surgical History:   Procedure Laterality Date   • BREAST AUGMENTATION Right    • BREAST IMPLANT REMOVAL Right    • FINGER SURGERY     • MANDIBLE SURGERY     • MOUTH SURGERY     • NECK SURGERY     • OTHER SURGICAL HISTORY      MULTIPLE SURGERIES TO REMOVE ANGIOMAS   • TONGUE SURGERY     • TUBAL ABDOMINAL LIGATION       Prescriptions Prior to Admission   Medication Sig Dispense Refill Last Dose   • SYNTHROID 175 MCG tablet Take 175 mcg by mouth Daily.   11/5/2018     Latex    Review of Systems:  Constitutional: negative for chills, fevers   Ears, nose, mouth, throat, and face: negative nasal congestion  Respiratory: negative for asthma and wheezing  Cardiovascular: negative for chest pain and dyspnea  Gastrointestinal: negative for dyspepsia, dysphagia abdominal pain  Genitourinary:negative for urinary incontinence  Hematologic/lymphatic: negative for bleeding  Musculoskeletal:negative for aches  Neurological: negative for numbness/tingling  Behavioral/Psych: negative for anhedonia  Allergic/Immunologic: negative for rash, allergy        Objective   LMP 10/21/2018   SpO2  97%    Physical Exam:    Gen:  NAD   Mental Status:   awake, alert, oriented   Heart:   well perfused, normal rate   Lungs:   no respiratory distress, respirations even   Abdomen:  no abdominal pain   Other findings noted:   See note from Office visit for complete exam     Assessment/Plan   ODESSA 2 on colposcopy with persistently abnormal pap smears  AUB, possible polyp in endometrium on US    I again discussed with the patient the risks and the benefits associated with the proposed procedure(s).  The patient has been given the opportunity to ask questions.  Alternatives to the proposed treatment (s) were discussed, including the likely results of no treatment.  The patient wishes to proceed.     Plan to proceed to OR for cold knife conization, dilation and curettage, hysteroscopy  She is accompanied today by her  and gives verbal consent for me to discuss operative findings following the surgery with them.  Postoperatively, she will be discharged with pain medication and follow up in 2 weeks  Reviewed nothing in the vagina, no swimming, no intercourse for at least 2 weeks.  Discussed anticipated postop symptoms including vaginal discharge.    Tamika Escalona MD  11/07/18  10:01 AM

## 2018-11-07 NOTE — DISCHARGE INSTRUCTIONS
SEDATION DISCHARGE INSTRUCTIONS.    IMPORTANT: The following information will help you return to your best level of health.  GENERAL ANESTHESIA.  You have had general anesthesia. You were given a medicine to help you go to sleep and not feel pain.    Follow these instructions:   Go right home. Rest quietly at home today, then you can be up and about.   Do not drink alcohol, drive or operate machinery for 24 hours.   Do not make any important decisions or sign any legal papers in the next 24 hours.   Have a RESPONSIBLE PERSON stay with you the rest of today and overnight for your protection and safety.   Start your diet with fluids and light foods (jello, soup, juice, toast). Then eat a normal diet if not nauseated.    Call your doctor if you have:   any blue or gray skin color.   repeated vomiting.   trouble breathing.   any new problems or concerns.    Dilatation and Curettage, Care After  Refer to this sheet for the next few weeks. These instructions provide you with information on caring for yourself after your procedure. Your health care provider may also give you more specific instructions.  Your treatment has been planned according to current medical practices, but problems sometimes occur.  Call your health care provider if you have any problems or questions after your care.  ? HOME CARE INSTRUCTIONS  1. It is normal to have vaginal bleeding/abdominal cramping for the next 2 weeks.  2. Wait 1 week before returning to strenuous activity.  3. Drink enough fluids to keep your urine clear or pale yellow.  4. You may shower tomorrow.  5. Do not take a bath, go swimming or use a hot tub until your health care provider approves.  6. Only take over-the-counter or prescription medicines as directed by your health care provider.  7. Follow up with your provider as directed.    ? YOU WILL BE ON PELVIC REST FOR THE NEXT 2 WEEKS OR UNTIL SPECIFIED BY YOUR PHYSICIAN. PELVIC REST INCLUDES:  1. Avoiding long periods of  standing.  2. Avoiding heavy lifting, pushing or pulling.  DO NOT lift anything heavier than 10 pounds (4.5 kg)  3. DO NOT douche, use tampons, or have sex (intercourse) for 2 weeks after the procedure.    ? SEEK MEDICAL CARE IF:    1. You have increasing cramps or pain that is not relieved with medicine.  2. You have bad smelling vaginal discharge.  3. You are having problems with any medicine.  4. You have bleeding that is heavier than a normal menstrual period (greater than 1 pad/hour) or you notice large clots.  5. You have a fever > 101.  6. You have chest pain or shortness of breath.

## 2018-11-08 LAB
CYTO UR: NORMAL
LAB AP CASE REPORT: NORMAL
PATH REPORT.FINAL DX SPEC: NORMAL
PATH REPORT.GROSS SPEC: NORMAL

## 2018-11-09 ENCOUNTER — TELEPHONE (OUTPATIENT)
Dept: OBSTETRICS AND GYNECOLOGY | Facility: CLINIC | Age: 38
End: 2018-11-09

## 2018-11-09 NOTE — TELEPHONE ENCOUNTER
Called patient and reviewed pathology - reviewed positive margins on endocervix but negative ECC.  Reviewed polyp and benign endometrium. Recommend follow up in 2-3 weeks for postop visit (pt will be on cycle in 2 weeks, so can wait till 3) and repeat pap smear in 4 months.

## 2018-11-16 ENCOUNTER — TELEPHONE (OUTPATIENT)
Dept: OBSTETRICS AND GYNECOLOGY | Facility: CLINIC | Age: 38
End: 2018-11-16

## 2018-11-16 NOTE — TELEPHONE ENCOUNTER
Called patient to review mammogram report received today. Patient had multiple findings on right side that were recommended for US guided cyst aspiration or biopsy.  She does have a h/o multiple right sided surgeries which likely complicates the imaging.     Patient did not answer phone call.  Left general  for call back. Will place orders in separate encounter.

## 2018-11-16 NOTE — TELEPHONE ENCOUNTER
"Patient called back stating that she had a mamm[place] when she found a knot on the right side - found out it was lymphangioma related.      Reports when she was lifting at [employer] last year; she had a mass develop in the axilla which was imaged at [facility] and subsequently infected. She is hesitant to get these procedures and is adamant that she will not unless she talks to \"a specialist.\"  Her prior specialists were Dr. John Mares ([facility]), Dr. Alvin Zeng ([facility]) who were both plastic surgeons and have subsequently retired.      I told the patient that it would take time but we will look into other possibilities for her including going to a breast surgeon, other imaging.    "

## 2018-11-21 ENCOUNTER — TELEPHONE (OUTPATIENT)
Dept: OBSTETRICS AND GYNECOLOGY | Facility: CLINIC | Age: 38
End: 2018-11-21

## 2018-11-21 NOTE — TELEPHONE ENCOUNTER
Called and reviewed referral to Dr. Ryder - patient in agreement.  Will route to [facility] to call to help get films.  Patient expressed agreement.

## 2018-11-21 NOTE — TELEPHONE ENCOUNTER
Called patient to review referral to Dr. Ryder and need to get images/films.  No answer.  Left general VM for call back during office hours.  Please let me speak with her if she calls back. Thanks!

## 2018-11-26 ENCOUNTER — TELEPHONE (OUTPATIENT)
Dept: SURGERY | Facility: CLINIC | Age: 38
End: 2018-11-26

## 2018-11-26 ENCOUNTER — TELEPHONE (OUTPATIENT)
Dept: OBSTETRICS AND GYNECOLOGY | Facility: CLINIC | Age: 38
End: 2018-11-26

## 2018-11-26 NOTE — TELEPHONE ENCOUNTER
Spoke to Pt to confirm appt with Dr. Mahmood on 12/07/2018 to arrive @ 1230    Pt expressed verbal understanding of appt.

## 2018-11-26 NOTE — TELEPHONE ENCOUNTER
Records faxed to Dr. Julieta Mahmood's office for review/scheduling, phone [phone],  fax [phone].  Pt aware Dr. Mahmood's office will call her to schedule a consult.    Per pt, her mammogram was done 15 yrs ago (approx 2003) at [facility], as well as a consult with plastic surgeon, Dr. John Hackett (now retired).  [facility] cannot access records older than 2003, and cannot confirm a mammogram or consult visit before 2003.  No records or films from mammogram or diagnosed Lymphangioma are available.

## 2018-11-27 ENCOUNTER — TELEPHONE (OUTPATIENT)
Dept: OBSTETRICS AND GYNECOLOGY | Facility: CLINIC | Age: 38
End: 2018-11-27

## 2018-11-27 RX ORDER — SULFAMETHOXAZOLE AND TRIMETHOPRIM 800; 160 MG/1; MG/1
1 TABLET ORAL 2 TIMES DAILY
Qty: 6 TABLET | Refills: 0 | Status: SHIPPED | OUTPATIENT
Start: 2018-11-27 | End: 2018-12-04

## 2018-11-27 RX ORDER — PHENAZOPYRIDINE HYDROCHLORIDE 200 MG/1
200 TABLET, FILM COATED ORAL 3 TIMES DAILY PRN
Qty: 10 TABLET | Refills: 0 | Status: SHIPPED | OUTPATIENT
Start: 2018-11-27 | End: 2018-12-04

## 2018-11-27 NOTE — TELEPHONE ENCOUNTER
Spoke with patient.  She is unable to come in for a urine specimen at this time.  Reports that she's been taking Azo but has not had relief.  She has had urinary tract infections in the past muscles very similar in symptoms.  She denies any antibiotic allergy.  Thinks she has taken Bactrim before.  I will prescribe a 3 day course of Bactrim.  The patient was recommended to come in if the symptoms do not improve for urine collection and exam or follow up with worsening symptoms such as fever, nausea, vomiting.

## 2018-11-27 NOTE — TELEPHONE ENCOUNTER
Pt said she had a procedure earlier this month and says she now has symptoms of a UTI. Symptoms are pressure when urinating and pain at the end of urine stream. She is asking if you can send a RX to her pharmacy. RX # in chart, call back # [phone] * I leave early at noon today so if the response is after then, can you please send this back to [facility] or [facility]? Thank you!

## 2018-12-04 ENCOUNTER — OFFICE VISIT (OUTPATIENT)
Dept: OBSTETRICS AND GYNECOLOGY | Facility: CLINIC | Age: 38
End: 2018-12-04

## 2018-12-04 VITALS
HEART RATE: 81 BPM | BODY MASS INDEX: 24.75 KG/M2 | HEIGHT: 64 IN | SYSTOLIC BLOOD PRESSURE: 129 MMHG | DIASTOLIC BLOOD PRESSURE: 82 MMHG | WEIGHT: 145 LBS

## 2018-12-04 DIAGNOSIS — N89.8 VAGINAL DISCHARGE: Primary | ICD-10-CM

## 2018-12-04 DIAGNOSIS — N89.8 VAGINAL ODOR: ICD-10-CM

## 2018-12-04 PROCEDURE — 99024 POSTOP FOLLOW-UP VISIT: CPT | Performed by: OBSTETRICS & GYNECOLOGY

## 2018-12-04 RX ORDER — PNV NO.95/FERROUS FUM/FOLIC AC 28MG-0.8MG
1 TABLET ORAL DAILY
Qty: 30 TABLET | Refills: 11 | Status: SHIPPED | OUTPATIENT
Start: 2018-12-04 | End: 2021-11-03

## 2018-12-04 NOTE — PROGRESS NOTES
Chief Complaint   Patient presents with   • Post-op     patient report odor but it comes and goes      Danya Frost is a 38 y.o. female who presents to the clinic 4 weeks status post cold knife conization for cervical dysplasia.  May 2018   Pap: LSIL, HPV positive (16/18 negative)  Colpo: ODESSA 2     Sept 2016   Pap: LSIL, HPV positive  Colpo: ECC negative    November 2018  CKC: ODESSA 2 with negative ectocervical margins and extending to endocervical margins at 6 to 9 o'clock and 9 to 12 o'clock with negative ECC    Eating a regular diet without difficulty. Bowel movements are normal. The patient is not having any pain.  Has had coming and going discharge with odor. Denies vaginal bleeding or abdominal pain. Denies fever/chills    Past Medical History:   Diagnosis Date   • Hard to intubate    • History of MRSA infection 2002    RIGHT BREAST AFTER BREAST IMPLANTS. TREATED AT [facility]   • Hypothyroid    • Lymphangioma      Past Surgical History:   Procedure Laterality Date   • BREAST AUGMENTATION Right    • BREAST IMPLANT REMOVAL Right    • FINGER SURGERY     • MANDIBLE SURGERY     • MOUTH SURGERY     • NECK SURGERY     • OTHER SURGICAL HISTORY      MULTIPLE SURGERIES TO REMOVE ANGIOMAS   • TONGUE SURGERY     • TUBAL ABDOMINAL LIGATION       Social History     Tobacco Use   • Smoking status: Never Smoker   • Smokeless tobacco: Never Used   Substance Use Topics   • Alcohol use: Yes     Comment: social   • Drug use: No     Family History   Problem Relation Age of Onset   • Breast cancer Neg Hx    • Ovarian cancer Neg Hx    • Uterine cancer Neg Hx    • Colon cancer Neg Hx    • Malig Hyperthermia Neg Hx          Review of Systems   Constitutional: Negative for chills and fever.   Respiratory: Negative for shortness of breath.    Cardiovascular: Negative for chest pain.   Gastrointestinal: Negative for abdominal pain.   Genitourinary: Positive for vaginal discharge. Negative for difficulty urinating, pelvic  "pain and vaginal bleeding.   Musculoskeletal: Negative for myalgias.   Neurological: Negative for dizziness and light-headedness.       OBJECTIVE:   Vitals:    12/04/18 1109   BP: 129/82   Pulse: 81   Weight: 65.8 kg (145 lb)   Height: 162.6 cm (64.02\")        Physical Exam   Constitutional: She is oriented to person, place, and time. She appears well-developed and well-nourished. No distress.   HENT:   Head: Normocephalic and atraumatic.   Left ear with injury from earrings, no erythema/induration/drainage   Eyes: EOM are normal. No scleral icterus.   Neck: Normal range of motion.   Cardiovascular: Normal rate and regular rhythm.   Pulmonary/Chest: Effort normal. No respiratory distress.   Abdominal: Soft. She exhibits no distension.   Genitourinary: Uterus normal and cervix normal. There is no rash, tenderness, lesion or injury on the right labia. There is no rash, tenderness, lesion or injury on the left labia. Right adnexum displays no mass and no tenderness. Left adnexum displays no mass and no tenderness. No bleeding in the vagina. Vaginal discharge found.   Genitourinary Comments: Well healed conization bed   Musculoskeletal: Normal range of motion.   Neurological: She is alert and oriented to person, place, and time.   Skin: Skin is warm and dry. No rash noted. She is not diaphoretic. No erythema.   Psychiatric: She has a normal mood and affect. Her behavior is normal. Judgment and thought content normal.       ASSESSMENT:  S/p CKC    PLAN:   Reviewed postop instructions, pelvic rest restrictions lifted   Pain control adequate  Recommend repeat ECC and pap smear in 6 months given positive endocervical margins.  Discussed implications of positive margins/negative ECC.  Reviewed importance of follow up.  Has appointment with Dr. Mahmood to discuss breast imaging/follow up.      "

## 2018-12-07 ENCOUNTER — OFFICE VISIT (OUTPATIENT)
Dept: SURGERY | Facility: CLINIC | Age: 38
End: 2018-12-07

## 2018-12-07 VITALS
HEART RATE: 101 BPM | WEIGHT: 145 LBS | DIASTOLIC BLOOD PRESSURE: 79 MMHG | OXYGEN SATURATION: 99 % | BODY MASS INDEX: 24.75 KG/M2 | HEIGHT: 64 IN | SYSTOLIC BLOOD PRESSURE: 140 MMHG

## 2018-12-07 DIAGNOSIS — D18.1 LYMPHANGIOMA: ICD-10-CM

## 2018-12-07 DIAGNOSIS — R92.8 ABNORMAL FINDING ON BREAST IMAGING: Primary | ICD-10-CM

## 2018-12-07 LAB
A VAGINAE DNA VAG QL NAA+PROBE: NORMAL SCORE
BVAB2 DNA VAG QL NAA+PROBE: NORMAL SCORE
C ALBICANS DNA VAG QL NAA+PROBE: NEGATIVE
C GLABRATA DNA VAG QL NAA+PROBE: NEGATIVE
C TRACH RRNA SPEC QL NAA+PROBE: NEGATIVE
MEGA1 DNA VAG QL NAA+PROBE: NORMAL SCORE
N GONORRHOEA RRNA SPEC QL NAA+PROBE: NEGATIVE
T VAGINALIS RRNA SPEC QL NAA+PROBE: NEGATIVE

## 2018-12-07 PROCEDURE — 99205 OFFICE O/P NEW HI 60 MIN: CPT | Performed by: SURGERY

## 2018-12-10 ENCOUNTER — TELEPHONE (OUTPATIENT)
Dept: SURGERY | Facility: CLINIC | Age: 38
End: 2018-12-10

## 2018-12-10 PROBLEM — D18.1 LYMPHANGIOMA: Status: ACTIVE | Noted: 2018-12-10

## 2018-12-10 PROBLEM — R92.8 ABNORMAL FINDING ON BREAST IMAGING: Status: ACTIVE | Noted: 2018-12-10

## 2018-12-10 NOTE — TELEPHONE ENCOUNTER
Dr. Mahmood ordered a Breast US and mammo as well, so Breast MRI was rescheduled so they could all be finished on the same day    Breast MRI is now scheduled for 12/26/2018 to arrive @ 10:15am.    Mammo is to follow @ 12:00 on 12/26/2018.    Breast US is to follow @ 12:30 on 12/26/2018.       Called and left message with patient to inform her of new imaging as well as appointment time change and new appointments, patient to call back and confirm.     Mailed patient reminder letter with appointment times.

## 2018-12-10 NOTE — PROGRESS NOTES
"[facility]     Referring Provider: Tamika Escalona MD     Chief complaint: Abnormal breast imaging     HPI: Ms. Danya Frost is a 39 yo woman, seen at the request of Tamika Escalona MD, for abnormal breast imaging. 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 She has a very complicated past surgical history and underwent so many operations on her jaw, right neck, chest, and upper extremity that she has lost count. Most of the operations happened when she was a small child at [facility]. At one of the initial operations, which sounds like it was a large volume resection involving her neck, axilla, and upper right chest, it is believed that her right breast bud was partially removed, resulting in incomplete development.    The right half of her tongue is also involved. In the remote past she underwent laser surgery on her tongue for small \"blisters.\" Currently, it will intermittently start to tingle and get swollen. She is not sure exactly what provokes this, but when it happens it is usually relieved by ibuprofen. She also has chronic, stable reduced neck mobility. When she was 13 yo, she developed progressive reduced jaw mobility and subsequently underwent a large reconstructive procedure on her maxilla and mandible. The multiple surgeries on her right arm have all been for \"tumor or cyst\" removal. Her right index and middle finger are also involved and have undergone reconstructive procedures. She can partially flex her middle finger but cannot flex her index finger at all.    She used to be followed by plastic surgeons, Dr. John Hackett ([facility]) and Dr. Gio Zeng ([facility]), who have since retired. In 2002, she began the process of breast reconstruction with tissue expanders and implants. This was complicated by multiple infections, which ultimately ended with an admission in 12/2003 for an MRSA " infection that caused spontaneous rupture of the skin over the prosthesis. Bilateral implants were removed and never replaced, as it was felt that her body could not tolerate a foreign object due to the biologic and anatomic variation caused by the lymphangioma. She reports that she also had multiple admissions throughout her childhood for various infections requiring antibiotics.    In 2017, she injured her right chest while carrying heavy box at work (she is a manager at [employer]). She did not break the skin. However, she subsequently develop a large swollen area in her right axilla. She was eventually admitted to [facility] with bacteremia and required inpatient followed by outpatient IV antibiotics. She had a right axillary ultrasound done during admission which showed hypoechoic structures with vascular flow, possibly related to the known lymphangioma, but no drainable fluid collection. The right axillary swelling and pain eventually resolved with antibiotics.    She reports that she had a mammogram in  that showed an abnormal finding on the right which was ultimately determined to be related to the lymphangioma, however these records are no longer available. She denies any prior history of breast biopsies. She denies any family history of breast or ovarian cancer.    In early 2018 she developed some left-sided breast pain. She denies any associated breast lumps, skin changes, or nipple discharge. The pain has since resolved. As part of the workup for the left breast pain she underwent bilateral breast imaging which found several incidental cystic lesions on the right (see imaging report details in breast history section below).  She was by herself in clinic today.     I personally reviewed her chart and summarized her relevant breast history/imagin-2003: Multiple bilateral expander/implant procedures, ultimately failed because of infection.    2018: Pt c/o left breast pain.    18,  Bilateral diagnostic MMG and Bilateral US ([facility]):  MMG: Heterogeneously dense.  1. There is no suspicious finding in the region of clinical concern. No masses, suspicious microcalcifications or architectural distortion are identified in the left breast.  2. There is a high density, oval mass measuring 14 mm with circumscribed margins seen in the posterior one-third region of the right breast at 1:00 located 7 cm from the nipple.  3. There is an irregular mass measuring 22 mm with circumscribed margins in the right axilla located 10 cm from the nipple.  US:  1. There is no evidence of any solid mass or abnormal cystic elements in the left breast.  2. Ultrasound is suggestive of an oval complicated cyst versus solid mass with partially defined margins measuring 14 x 10 x 11 mm. Internal echotexture is heterogeneous and hypoechoic relative to adipose tissue.  3. Ultrasound is suggestive of a cluster of cyst with well-defined, thin margins measuring 22 x 7 x 8 mm. Internal architecture is anechoic.  4. There is an oval elongated debris-containing cyst with well-defined, thin margins measuring 5.1 x 0.9 x 2.5 cm seen in the right breast at 6:00.  IMPRESSION:  1. Area in the left breast is benign-negative.  2. Complicated cyst versus solid mass in the posterior one-third region of the right breast at 1:00 located 7 cm from the nipple is suspicious. Ultrasound-guided cyst aspiration is recommended to be followed by ultrasound-guided biopsy it cyst aspiration is unsuccessful.  3. Cluster of cysts in the right axilla located 10 cm from the nipple is suspicious. Ultrasound-guided cyst aspiration is recommended.  4. Debris-containing cyst in the right breast at 6:00 is suspicious. Ultrasound guided cyst aspiration is recommended.  BIRADS 4, Suspicious.       Review of Systems   Constitutional: Positive for appetite change (Recent increased appetite and some weight gain). Negative for chills, diaphoresis, fatigue, fever and  unexpected weight change.   HENT:   Negative for hearing loss, lump/mass, mouth sores, nosebleeds, sore throat, tinnitus, trouble swallowing and voice change.    Eyes: Negative for eye problems and icterus.   Respiratory: Negative for chest tightness, cough, hemoptysis, shortness of breath and wheezing.    Cardiovascular: Negative for chest pain, leg swelling and palpitations.   Gastrointestinal: Negative for abdominal distention, abdominal pain, blood in stool, constipation, diarrhea, nausea, rectal pain and vomiting.   Endocrine: Negative for hot flashes.   Genitourinary: Negative for bladder incontinence, difficulty urinating, dyspareunia, dysuria, frequency, hematuria, menstrual problem, nocturia, pelvic pain, vaginal bleeding and vaginal discharge.    Musculoskeletal: Positive for arthralgias. Negative for back pain, flank pain, gait problem, myalgias, neck pain and neck stiffness.   Skin: Negative for itching, rash and wound.   Neurological: Negative for dizziness, extremity weakness, gait problem, headaches, light-headedness, numbness, seizures and speech difficulty.   Hematological: Negative for adenopathy. Does not bruise/bleed easily.   Psychiatric/Behavioral: Negative for confusion, decreased concentration, depression, sleep disturbance and suicidal ideas. The patient is not nervous/anxious.        Medications:    Current Outpatient Medications:   •  SYNTHROID 175 MCG tablet, Take 175 mcg by mouth Daily., Disp: , Rfl:   •  Prenatal Vit-Fe Fumarate-FA (PRENATAL VITAMIN) 27-0.8 MG tablet, Take 1 tablet by mouth Daily., Disp: 30 tablet, Rfl: 11      Allergies   Allergen Reactions   • Latex Irritability       Past Medical History:   Diagnosis Date   • Hard to intubate    • History of MRSA infection 2002    RIGHT BREAST AFTER BREAST IMPLANTS. TREATED AT [facility]   • Hypothyroid    • Lymphangioma        Past Surgical History:   Procedure Laterality Date   • BREAST AUGMENTATION Right    • BREAST  IMPLANT REMOVAL Right    • FINGER SURGERY     • MANDIBLE SURGERY     • MOUTH SURGERY     • NECK SURGERY     • OTHER SURGICAL HISTORY      MULTIPLE SURGERIES TO REMOVE ANGIOMAS   • TONGUE SURGERY     • TUBAL ABDOMINAL LIGATION         Family History   Problem Relation Age of Onset   • Breast cancer Neg Hx    • Ovarian cancer Neg Hx    • Uterine cancer Neg Hx    • Colon cancer Neg Hx    • Malig Hyperthermia Neg Hx        Social History     Socioeconomic History   • Marital status:      Spouse name: Not on file   • Number of children: 2   • Years of education: Not on file   • Highest education level: Not on file   Social Needs   • Financial resource strain: Not on file   • Food insecurity - worry: Not on file   • Food insecurity - inability: Not on file   • Transportation needs - medical: Not on file   • Transportation needs - non-medical: Not on file   Occupational History   • Occupation: Manager at [employer]   Tobacco Use   • Smoking status: Never Smoker   • Smokeless tobacco: Never Used   Substance and Sexual Activity   • Alcohol use: Yes     Comment: social   • Drug use: No   • Sexual activity: Yes     Partners: Male     Birth control/protection: Surgical   Other Topics Concern   • Not on file   Social History Narrative   • Not on file     Patient drinks 1 servings of caffeine per day.       GYNECOLOGIC HISTORY:   . P: 2. AB: 0.  Last menstrual period: 16  Age at menarche: 12  Age at first childbirth: 16  Lactation/How long: no  Age at menopause: premenopausal  Total years of oral contraceptive use: unsure  Total years of hormone replacement therapy: none      Physical Exam  Vitals:    18 1321   BP: 140/79   Pulse: 101   SpO2: 99%     ECOG 0 - Asymptomatic  General: NAD, well appearing  Psych: a&o x 3, normal mood and affect  Eyes: EOMI, no scleral icterus  Mouth: Lips are asymmetric. She can only partially open her mouth. The right half of tongue is enlarged, there are multiple small cysts seen in  the middle of the tongue, and there is a central cleft-like separation between the abnormal right tongue and normal left tongue.  Neck: Horizontal scar on the right anterior neck. The entire neck is enlarged and thickened with dense tissue. She can only rotate her neck about 45 degrees to either the left or right. She has minimal neck flexion and cannot touch her chin to her chest. She also has minimal neck extension and can barely extend it back.  Resp: normal effort, CTAB  CV: RRR, no murmurs, no edema   GI: soft, NT, ND  MSK: Right arm is circumferentially larger than the left. The soft tissue is dense and firm. There are multiple scars on the anterior and posterior, upper and lower, right arm. On the right hand, the index and middle fingers are enlarged and firm with anterior and posterior scars. She can partially flex the middle finger but cannot flex the index finger.  Maximal circumferential measurement:    Right arm: Left arm:  Wrist:  18 cm  16.5 cm  Forearm:  34 cm  28 cm  Upper arm: 35 cm  29 cm  Lymph nodes: No left axillary lymphadenopathy. The right axilla has a large scar which is retracted and tight.   Breast: left is ~3x larger than right  Right: On inspection there is a circumferential periareolar scar as well as a large chest wall scar inferior and lateral to the breast. There are multiple other smaller scars in the right upper chest and axilla. At 1:00, 7 cm from the nipple, there is a 1 cm mobile mass. At 3:00 there is a dense cord of tissue. At 5:30, 6-7 cm from the nipple, near the IMF, there is a fluctuant area. No nipple abnormalities.  Left: On inspection there is an inferior periareolar scar, otherwise no visible abnormalities while seated, with arms raised or hands on hips. No masses, skin changes, or nipple abnormalities.    Faith Chery MA was present for the entire examination.     I have independently reviewed her imaging and here are my findings:   Multiple complex cystic lesions  throughout the right breast and right axilla. Although decreased, there is breast parenchyma visualized on the right.       Assessment:  38 y.o. F with abnormal right breast imaging and a complicated past history of lymphangioma of the upper right torso.    Discussion:  Regarding her abnormal breast imaging, we discussed that screening mammograms are used to detect changes in imaging from the prior year or to detect findings at baseline. If there are changes seen, this study is followed by diagnostic imaging, often a diagnostic mammogram with or without targeted breast ultrasound. We discussed that the designation of a BIRADS 4 signifies that there is a 3-94% possibility of malignancy accounting for the imaging finding. We discussed the recommendation to biopsy all BIRADS 4 lesions. We discussed a second option, and recommended only if 1- the above lesion was not technically amenable to either percutaneous or to surgical biopsy, or 2-if the patient refused biopsy. This option would be imaging surveillance in 3-6 months.    Her case is very complicated because of her history of lymphangioma, a congenital lymphatic malformation that can present with cystic structures. She has residual disease on her entire upper right torso and I believe that it is very possible that the cystic lesions seen on breast imaging are part of the lymphangioma process and not arising from the breast parenchyma. She has very fragile tissue in the right upper chest and is prone to severe systemic infection with even the slightest tissue insult, so we must be conservative with any invasive procedure. I think the best option would be to obtain more complete breast imaging at this time with an MRI and then to place the cystic lesions into imaging surveillance.     We discussed the fact that she does have residual breast tissue on the right and breast tissue on the left. We discussed that she does have a ~12% lifetime risk of breast cancer (same  as general population) and so we must develop a long-term screening/surveillance plan. Given the increased risk of complications associated with biopsy in her, we will need to adjust our threshold for biopsy. This may mean that we put lesions into imaging surveillance that we would normally biopsy in another woman, accepting a slightly higher risk of malignancy, and watch her closely with clinical exam and imaging. We also discussed the possibility that at some point in the future, I may recommend biopsy. If this were to occur, we would need to come up with strategies to help reduce her risk of complications, such as prophylactic antibiotics.     She hasn't been followed by anyone specializing in lymphangioma since she was a child. Even though her disease is currently stable, I think it would be prudent to get someone involved now to help with long-term recommendations and to be available in the event of an acute exacerbation. I think she would benefit from physical therapy for her limited neck mobility. Also, her chronic tongue edema and limited jaw function is concerning. At this point, if she needed a surgical procedure, her airway risk may be prohibitive. I would rather a lymphangioma specialist comment on what if any additional therapies are indicated for her head and neck. I will reach out to some of the local pediatric surgeons for recommendations. She may need to travel outside of [place] to see a specialist and she is willing to do this.     Plan:  -Bilateral breast MRI.  -Right diagnostic MMG & US in 2 weeks (this will actually be 3 mths after 9/20/18 study). Pt requests [facility] at [facility].    -Will refer to plastic surgery, possibly a pediatric surgeon, who specializes in lymphangioma.  -F/u in 1 month.  -She was instructed to call sooner with any questions, concerns or changes on BSE.    Julieta Mahmood MD    I have spent 70 min in face to face time with the patient and 50 min of this time  was spent in counseling on the above stated issues.       CC:  Tamika Escalona MD

## 2018-12-10 NOTE — TELEPHONE ENCOUNTER
Breast MRI is scheduled for 12/20/2018 to arrive @ 10:15am.    Spoke with Rain Baez's office, Appointment with Dr. Baez is scheduled for 1/09/2019 to arrive @ 3:20.    Follow up appointment with Dr. Mahmood is scheduled for 1/14/2019 to arrive @ 9:45 am.    Spoke to patient to confirm these appointments, patient expressed verbal understanding of all appointments.    Mailed patient reminder letter with appointment times.

## 2018-12-11 ENCOUNTER — TELEPHONE (OUTPATIENT)
Dept: SURGERY | Facility: CLINIC | Age: 38
End: 2018-12-11

## 2018-12-26 ENCOUNTER — HOSPITAL ENCOUNTER (OUTPATIENT)
Dept: MAMMOGRAPHY | Facility: HOSPITAL | Age: 38
Discharge: HOME | End: 2018-12-26
Attending: SURGERY | Admitting: SURGERY

## 2018-12-26 ENCOUNTER — HOSPITAL ENCOUNTER (OUTPATIENT)
Dept: MRI IMAGING | Facility: HOSPITAL | Age: 38
Discharge: HOME | End: 2018-12-26
Attending: SURGERY

## 2018-12-26 ENCOUNTER — HOSPITAL ENCOUNTER (OUTPATIENT)
Dept: ULTRASOUND IMAGING | Facility: HOSPITAL | Age: 38
Discharge: HOME | End: 2018-12-26
Attending: SURGERY

## 2018-12-26 DIAGNOSIS — R92.8 ABNORMAL FINDING ON BREAST IMAGING: ICD-10-CM

## 2018-12-26 PROCEDURE — 77065 DX MAMMO INCL CAD UNI: CPT

## 2018-12-26 PROCEDURE — 76642 ULTRASOUND BREAST LIMITED: CPT

## 2018-12-26 PROCEDURE — G0279 TOMOSYNTHESIS, MAMMO: HCPCS

## 2018-12-26 PROCEDURE — A9577 INJ MULTIHANCE: HCPCS | Performed by: SURGERY

## 2018-12-26 PROCEDURE — 0 GADOBENATE DIMEGLUMINE 529 MG/ML SOLUTION: Performed by: SURGERY

## 2018-12-26 PROCEDURE — 0159T HC MRI BREAST COMPUTER ANALYSIS: CPT

## 2018-12-26 PROCEDURE — C8908 MRI W/O FOL W/CONT, BREAST,: HCPCS

## 2018-12-26 RX ADMIN — GADOBENATE DIMEGLUMINE 13 ML: 529 INJECTION, SOLUTION INTRAVENOUS at 11:41

## 2019-01-14 ENCOUNTER — OFFICE VISIT (OUTPATIENT)
Dept: SURGERY | Facility: CLINIC | Age: 39
End: 2019-01-14

## 2019-01-14 VITALS
BODY MASS INDEX: 23.63 KG/M2 | SYSTOLIC BLOOD PRESSURE: 128 MMHG | HEART RATE: 84 BPM | HEIGHT: 64 IN | OXYGEN SATURATION: 99 % | WEIGHT: 138.4 LBS | DIASTOLIC BLOOD PRESSURE: 72 MMHG

## 2019-01-14 DIAGNOSIS — Q89.9 CONGENITAL ABNORMALITY: ICD-10-CM

## 2019-01-14 DIAGNOSIS — R92.8 ABNORMAL FINDING ON BREAST IMAGING: Primary | ICD-10-CM

## 2019-01-14 PROCEDURE — 99214 OFFICE O/P EST MOD 30 MIN: CPT | Performed by: SURGERY

## 2019-01-14 NOTE — PROGRESS NOTES
"[facility]     Referring Provider: Tamika Escalona MD     Chief complaint: F/u imaging     HPI:   12/7/18:  Ms. Danya Frost is a 39 yo woman, seen at the request of Tamika Escalona MD, for abnormal breast imaging. 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 She has a very complicated past surgical history and underwent so many operations on her jaw, right neck, chest, and upper extremity that she has lost count. Most of the operations happened when she was a small child at [facility]. At one of the initial operations, which sounds like it was a large volume resection involving her neck, axilla, and upper right chest, it is believed that her right breast bud was partially removed, resulting in incomplete development.     The right half of her tongue is also involved. In the remote past she underwent laser surgery on her tongue for small \"blisters.\" Currently, it will intermittently start to tingle and get swollen. She is not sure exactly what provokes this, but when it happens it is usually relieved by ibuprofen. She also has chronic, stable reduced neck mobility. When she was 13 yo, she developed progressive reduced jaw mobility and subsequently underwent a large reconstructive procedure on her maxilla and mandible. The multiple surgeries on her right arm have all been for \"tumor or cyst\" removal. Her right index and middle finger are also involved and have undergone reconstructive procedures. She can partially flex her middle finger but cannot flex her index finger at all.     She used to be followed by plastic surgeons, Dr. John Hackett ([facility]) and Dr. Gio Zeng ([facility]), who have since retired. In 2002, she began the process of breast reconstruction with tissue expanders and implants. This was complicated by multiple infections, which ultimately ended with an admission in 12/2003 for an MRSA " infection that caused spontaneous rupture of the skin over the prosthesis. Bilateral implants were removed and never replaced, as it was felt that her body could not tolerate a foreign object due to the biologic and anatomic variation caused by the lymphangioma. She reports that she also had multiple admissions throughout her childhood for various infections requiring antibiotics.     In 12/2017, she injured her right chest while carrying heavy box at work (she is a manager at [employer]). She did not break the skin. However, she subsequently develop a large swollen area in her right axilla. She was eventually admitted to [place] with bacteremia and required inpatient followed by outpatient IV antibiotics. She had a right axillary ultrasound done during admission which showed hypoechoic structures with vascular flow, possibly related to the known lymphangioma, but no drainable fluid collection. The right axillary swelling and pain eventually resolved with antibiotics.     She reports that she had a mammogram in 2003 that showed an abnormal finding on the right which was ultimately determined to be related to the lymphangioma, however these records are no longer available. She denies any prior history of breast biopsies. She denies any family history of breast or ovarian cancer.     In early September 2018 she developed some left-sided breast pain. She denies any associated breast lumps, skin changes, or nipple discharge. The pain has since resolved. As part of the workup for the left breast pain she underwent bilateral breast imaging which found several incidental cystic lesions on the right (see imaging report details in breast history section below).    1/14/19, Interval History:  Since her last visit, she underwent right diagnostic MMG and US and bilateral breast MRI, see imaging report details in breast history section below. She also was seen by Dr. Baez. She denies any new complaints. She is on a new diet and has  lost about 7 pounds in the last month. She was by herself in clinic today.        Breast history/imaging (updated 1/14/19):    5862-4929: Multiple bilateral expander/implant procedures, ultimately failed because of infection.     9/2018: Pt c/o left breast pain.     9/20/18, Bilateral diagnostic MMG and Bilateral US ([facility]):  MMG: Heterogeneously dense.  1. There is no suspicious finding in the region of clinical concern. No masses, suspicious microcalcifications or architectural distortion are identified in the left breast.  2. There is a high density, oval mass measuring 14 mm with circumscribed margins seen in the posterior one-third region of the right breast at 1:00 located 7 cm from the nipple.  3. There is an irregular mass measuring 22 mm with circumscribed margins in the right axilla located 10 cm from the nipple.  US:  1. There is no evidence of any solid mass or abnormal cystic elements in the left breast.  2. Ultrasound is suggestive of an oval complicated cyst versus solid mass with partially defined margins measuring 14 x 10 x 11 mm. Internal echotexture is heterogeneous and hypoechoic relative to adipose tissue.  3. Ultrasound is suggestive of a cluster of cyst with well-defined, thin margins measuring 22 x 7 x 8 mm. Internal architecture is anechoic.  4. There is an oval elongated debris-containing cyst with well-defined, thin margins measuring 5.1 x 0.9 x 2.5 cm seen in the right breast at 6:00.  IMPRESSION:  1. Area in the left breast is benign-negative.  2. Complicated cyst versus solid mass in the posterior one-third region of the right breast at 1:00 located 7 cm from the nipple is suspicious. Ultrasound-guided cyst aspiration is recommended to be followed by ultrasound-guided biopsy it cyst aspiration is unsuccessful.  3. Cluster of cysts in the right axilla located 10 cm from the nipple is suspicious. Ultrasound-guided cyst aspiration is recommended.  4. Debris-containing cyst in the right  breast at 6:00 is suspicious. Ultrasound guided cyst aspiration is recommended.  BIRADS 4, Suspicious.     12/26/18, Right diagnostic MMG, Right US, & Bilateral Breast MRI ([facility]):  MMG:  Marker indicates a right breast skin scar in the upper-outer quadrant. Deep to the marker, there are linear areas of scarring similar to the 09/20/2018 examination. No suspicious calcifications within the right breast. Tomosynthesis demonstrates a circumscribed 15 mm nodule at the approximate 1:00 position. Along the right axilla there are several separate clustered circumscribed nodules or multiloculated nodular structure. It is similar in appearance to the previous mammogram as well.  No new breast mass.  US:   At the 1:00 position, 7 cm from the nipple there is a 1.4 x 1.0 x 1.1 cm complex cyst devoid of internal flow.  At the 6:00 position, 5 cm from the nipple, there is again demonstrated a spindle-shaped complex fluid collection similar to the previous  examination measuring 5.1 x 0.8 x 2.8 cm.  Located in the right axilla there is a collection of cysts, both simple and complex which are individually all subcentimeter in size. Together they measure 2.2 x 0.9 x 0.7 cm, similar to the previous examination.  MRI:  There is asymmetry in breast size, right smaller than left in keeping with prior right breast surgery. In the posterior 1/3 of the  right breast at the 12 o'clock position there is a T1 hypointense weakly enhancing mass that measures 1.8 cm in the anterior posterior dimension, 1.2 cm in the medial lateral dimension, and 1.5 cm in the superior inferior dimension. A metallic clip is not seen in association with the mass.  In the axillary tail region of the right breast there is a T2 hyperintense lobulated mass that measures on the order of 1.6 cm in the  medial lateral dimension, 2.4 cm in the superior inferior dimension, and 1.3 cm in the anterior posterior dimension. Linear areas of increased T2 signal are seen in the  subcutaneous region of the right axillary tail and right axilla. These likely represent areas of lymphangioma.  In the right axilla deep to the musculature there is a 3.8 x 2.1 x 3.3 cm dilated enhancing masslike region that has the appearance of an  ectatic vessel. It does not appear tortuous but may represent the presence of a lymphangioma in the right axilla.  No overt features of malignancy are appreciated but it may be prudent to consider sampling 1 lesions in the right breast, namely the lesion at the 12 o'clock position or possibly the lesion in the right axilla/axillary tail that appears less vascular.  No other areas of abnormal enhancement or morphology are seen within the right breast. There are no findings suspicious for malignancy in the left breast.   BI-RADS 4: Suspicious.      Review of Systems - Oncology  See interval history.       Medications:    Current Outpatient Medications:   •  SYNTHROID 175 MCG tablet, Take 175 mcg by mouth Daily., Disp: , Rfl:   •  Prenatal Vit-Fe Fumarate-FA (PRENATAL VITAMIN) 27-0.8 MG tablet, Take 1 tablet by mouth Daily., Disp: 30 tablet, Rfl: 11      Allergies   Allergen Reactions   • Latex Irritability       Family History   Problem Relation Age of Onset   • Breast cancer Neg Hx    • Ovarian cancer Neg Hx    • Uterine cancer Neg Hx    • Colon cancer Neg Hx    • Malig Hyperthermia Neg Hx        PHYSICAL EXAMINATION:   Vitals:    01/14/19 0950   BP: 128/72   Pulse: 84   SpO2: 99%     ECOG 0 - Asymptomatic  General: NAD, well appearing  Psych: a&o x 3, normal mood and affect  Eyes: EOMI, no scleral icterus  Mouth: Lips are asymmetric. Can only partially open her mouth. Right half of tongue is enlarged, there are multiple small cysts seen in the middle of the tongue, and there is a central cleft-like separation between the abnormal right tongue and normal left tongue.  Neck: Horizontal scar on the right anterior neck. Entire neck is enlarged and thickened with dense  tissue. Can only rotate about 45 degrees to either the left or right. Unable to hyperextend or hyperflex.  MSK: Right arm is hypertrophic. There are multiple scars on the anterior and posterior, upper and lower, right arm. On the right hand, the index and middle fingers are enlarged and firm with anterior and posterior scars.   Lymph nodes: No left axillary lymphadenopathy. The right axilla has a large scar which is retracted and tight.   Breast: left is ~3x larger than right  Right: On inspection there is a circumferential periareolar scar as well as a large chest wall scar inferior and lateral to the breast. There are multiple other smaller scars in the right upper chest and axilla. At 1:00, 7 cm from the nipple, there is a 1 cm mobile mass. At 5:30, 6-7 cm from the nipple, near the IMF, there is a fluctuant area. No nipple abnormalities.  Left: On inspection there is an inferior periareolar scar, otherwise no visible abnormalities while seated, with arms raised or hands on hips. No masses, skin changes, or nipple abnormalities.    Faith Chery MA was present for the entire examination.     I have independently reviewed her imaging and here are my findings:   Multiple complex cystic lesions throughout the right breast and right axilla are stable in size and appearance from prior imaging 3 months ago.    Assessment:  38 y.o. 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    Discussion:  We again discussed the management of a BI-RADS 4 lesion on breast imaging and the fact that a BI-RADS 4 designation signifies that there is a 3-94% possibility of malignancy accounting for the imaging finding. We discussed the recommendation to biopsy all BI-RADS 4 lesions. We discussed a second option, and recommended only if 1- the above lesion was not technically amenable to either percutaneous or to surgical biopsy, or 2-if the patient refused biopsy. This  option would be imaging surveillance in 3-6 months.    She still declines biopsy because of the possibility that these lesions are related to her congenital malformation and because of her history of multiple infectious complications. I think this is reasonable since these lesions have been stable over the last 3 months. We will continue surveillance with repeat imaging in 6 months and she is happy with this plan. If any of the lesions change significantly over that time period, then biopsy will be recommended. She understands that there is a chance that one or more of the lesions represent a breast malignancy, and that by declining biopsy, we are potentially delaying diagnosis.    I reviewed Dr. Baez's note. 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 He has agreed to establish a sense leadership for the patient to guide her through the hemihypertrophy problem. At this point she has no acute issues and he has recommended observation. He did also offer her breast reconstruction to address the asymmetry, but the patient is not currently interested in this. If she were to decide to undergo reconstruction in the future, we would need to resolve the abnormal imaging findings in surveillance prior to surgery and this would likely require multiple biopsies.    Plan:  -F/u in 6 months with bilateral diagnostic mammogram, right ultrasound, and MRI.  -She was instructed to call sooner with any questions, concerns or changes on BSE.    Julieta Mahmood MD      CC:  MD Donell Johnson MD

## 2019-01-16 ENCOUNTER — TELEPHONE (OUTPATIENT)
Dept: SURGERY | Facility: CLINIC | Age: 39
End: 2019-01-16

## 2019-01-16 NOTE — TELEPHONE ENCOUNTER
Breast MRI, US and MMG are scheduled on 7/15/2019 to arrive @ 7:45am.    F/u appointment with Dr. Mahmood is scheduled for 7/22/2019 to arrive @ 10:45am.    Called and left message with patient about appointments, patient to call back and confirm.

## 2019-01-17 ENCOUNTER — TELEPHONE (OUTPATIENT)
Dept: SURGERY | Facility: CLINIC | Age: 39
End: 2019-01-17

## 2019-01-17 NOTE — TELEPHONE ENCOUNTER
I spoke with Dr. Baez on the phone to clarify his note. He believes that the process involving her right upper extremity is more consistent with hemihypertrophy. It is very possible that she also has a coexisting thoracoaxillary lymphangioma/cystic hygroma, but he can't verify this without seeing prior pathology reports. He will continue to follow her for her issues related to congenital malformations. He will obtain clearance from me prior to proceeding with any breast reconstructive procedures, should the patient desire this in the future.    Julieta Mahmood MD

## 2019-05-21 ENCOUNTER — TELEPHONE (OUTPATIENT)
Dept: OBSTETRICS AND GYNECOLOGY | Facility: CLINIC | Age: 39
End: 2019-05-21

## 2019-05-21 RX ORDER — METRONIDAZOLE 500 MG/1
500 TABLET ORAL 2 TIMES DAILY
Qty: 14 TABLET | Refills: 0 | Status: SHIPPED | OUTPATIENT
Start: 2019-05-21 | End: 2019-05-28

## 2019-05-21 NOTE — TELEPHONE ENCOUNTER
Pt called stating she is having vaginal discharge/odor. She is asking for a RX to be sent to her pharmacy. Please advise.    Pt # [phone]    Pharmacy is in chart

## 2019-07-15 ENCOUNTER — APPOINTMENT (OUTPATIENT)
Dept: MAMMOGRAPHY | Facility: HOSPITAL | Age: 39
End: 2019-07-15
Attending: SURGERY

## 2019-07-16 ENCOUNTER — TELEPHONE (OUTPATIENT)
Dept: SURGERY | Facility: CLINIC | Age: 39
End: 2019-07-16

## 2019-07-16 NOTE — TELEPHONE ENCOUNTER
Called pt about appt and cancellation of her MMG, US and MRI. Pt stated she was not able to afford to have them all three done at this present time. Insurance company stated she would be responsible for $900.00 of the cost.   Spoke to Dr. Mahmood. Dr. Mahmood strongly advised that she get the images and return to see her.   Pt stated she could not do it at this time but will call back in a month or so to reschedule all appt. WILLIAM

## 2021-04-06 ENCOUNTER — BULK ORDERING (OUTPATIENT)
Dept: CASE MANAGEMENT | Facility: OTHER | Age: 41
End: 2021-04-06

## 2021-04-06 DIAGNOSIS — Z23 IMMUNIZATION DUE: ICD-10-CM

## 2021-11-03 ENCOUNTER — OFFICE VISIT (OUTPATIENT)
Dept: OBSTETRICS AND GYNECOLOGY | Facility: CLINIC | Age: 41
End: 2021-11-03

## 2021-11-03 VITALS — WEIGHT: 141 LBS | HEIGHT: 64 IN | BODY MASS INDEX: 24.07 KG/M2

## 2021-11-03 DIAGNOSIS — N89.8 VAGINAL DISCHARGE: ICD-10-CM

## 2021-11-03 DIAGNOSIS — Z01.419 WOMEN'S ANNUAL ROUTINE GYNECOLOGICAL EXAMINATION: Primary | ICD-10-CM

## 2021-11-03 DIAGNOSIS — N92.0 MENORRHAGIA WITH REGULAR CYCLE: ICD-10-CM

## 2021-11-03 DIAGNOSIS — Z30.018 ENCOUNTER FOR INITIAL PRESCRIPTION OF OTHER CONTRACEPTIVES: ICD-10-CM

## 2021-11-03 DIAGNOSIS — Z86.018: ICD-10-CM

## 2021-11-03 PROCEDURE — 99396 PREV VISIT EST AGE 40-64: CPT | Performed by: NURSE PRACTITIONER

## 2021-11-03 PROCEDURE — 99214 OFFICE O/P EST MOD 30 MIN: CPT | Performed by: NURSE PRACTITIONER

## 2021-11-03 NOTE — PROGRESS NOTES
GYN Annual Exam     Chief Complaint   Patient presents with   • Gynecologic Exam     Annual exam - last pap 2018 LGSIL, cervical conization 2018   • Vaginal Discharge     Pt c/o vaginal discharge with slight odor       HPI    Danya Frost is a 40 y.o. female who presents for annual well woman exam with a problem.  She is sexually active. Periods are regular every 28-30 days, lasting 5 days. Dysmenorrhea:none. She does have heavy bleeding for first 3 days of cycle, requiring ultra pads and tampons, however she is not saturating a pad an hour on these days. No intermenstrual bleeding or spotting. Performing SBE:monthly. Last pap in  LSIL,  Negative conization . She reports a several year hx of abnormal pap smears.  She was last seen by our office 2018    She is interested in IUD to manage heavy bleeding with menses. Denies history of migraine with aura, denies history of DVT, there is no family history of DVT. She is a nonsmoker.    C/o milky white vaginal discharge X1 week, reports mild odor and itching. Denies fish like odor. Recently treated for UTI with cipro 2021).  Reports frequent changes in soap products. She is not using douches. Denies new partners. Denies pelvic pain or dysuria. Reports foul smelling watery vaginal discharge following menses, this has been occurring since .     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  She was supposed to complete breast biopsy, but did not follow up. Last seen . She reports she has been concerned with the cost of follow up procedures as well as she did not feel these tests were necessary. She has not completed breast imaging since 2019    This is my first time meeting Danya Frost  She is a patient of Dr. Escalona's.     OB History        2    Para   2    Term   2             AB        Living   2       SAB        IAB        Ectopic        Molar        Multiple        Live Births   1                LMP- 10/11/21  Current contraception: tubal ligation  Last Pap- 5/2018 LSIL, cervical conization 11/2018  History of abnormal Pap smear: yes - see above  History of STD-denies  Family history of uterine, colon or ovarian cancer: no  Family history of breast cancer: no  Last Mammogram: 2019    Past Medical History:   Diagnosis Date   • Hard to intubate    • History of MRSA infection 2002    RIGHT BREAST AFTER BREAST IMPLANTS. TREATED AT [facility]   • Hypothyroid    • Lymphangioma        Past Surgical History:   Procedure Laterality Date   • BREAST AUGMENTATION Right    • BREAST BIOPSY     • BREAST IMPLANT REMOVAL Right    • CERVICAL CONIZATION N/A 11/7/2018    Procedure: CERVICAL CONIZATION;  Surgeon: Tamika Escalona MD;  Location: [facility];  Service: Obstetrics/Gynecology   • D & C HYSTEROSCOPY N/A 11/7/2018    Procedure: DILATATION AND CURETTAGE HYSTEROSCOPY WITH Myosure;  Surgeon: Tamika Escalona MD;  Location: [facility];  Service: Obstetrics/Gynecology   • FINGER SURGERY     • MANDIBLE SURGERY     • MOUTH SURGERY     • NECK SURGERY     • OTHER SURGICAL HISTORY      MULTIPLE SURGERIES TO REMOVE ANGIOMAS   • TONGUE SURGERY     • TUBAL ABDOMINAL LIGATION           Current Outpatient Medications:   •  SYNTHROID 175 MCG tablet, Take 175 mcg by mouth Daily., Disp: , Rfl:     Allergies   Allergen Reactions   • Latex Irritability     Other reaction(s): Irritability       Social History     Tobacco Use   • Smoking status: Never Smoker   • Smokeless tobacco: Never Used   Substance Use Topics   • Alcohol use: Yes     Comment: social   • Drug use: No       Family History   Problem Relation Age of Onset   • Breast cancer Neg Hx    • Ovarian cancer Neg Hx    • Uterine cancer Neg Hx    • Colon cancer Neg Hx    • Malig Hyperthermia Neg Hx        Review of Systems  "  Constitutional: Negative for chills, fatigue and fever.   Gastrointestinal: Negative for abdominal distention, abdominal pain, nausea and vomiting.   Genitourinary: Positive for menstrual problem and vaginal discharge. Negative for breast discharge, breast lump, breast pain, dysuria, pelvic pain, pelvic pressure, vaginal bleeding and vaginal pain.        + vaginal odor  + vaginal itching    Musculoskeletal: Negative for gait problem.   Skin: Negative for rash.   Neurological: Negative for dizziness and headache.   Psychiatric/Behavioral: Negative for behavioral problems.       Ht 162.6 cm (64\")   Wt 64 kg (141 lb)   LMP 10/11/2021   BMI 24.20 kg/m²     Physical Exam  Constitutional:       Appearance: Normal appearance.   Genitourinary:      Vulva, bladder and urethral meatus normal.      No lesions in the vagina.      Right Labia: No rash, tenderness, lesions, skin changes or Bartholin's cyst.     Left Labia: No tenderness, lesions, skin changes, Bartholin's cyst or rash.     No labial fusion noted.      No inguinal adenopathy present in the right or left side.     Vaginal discharge (thin, white, small amount, mild odor noted) present.      No vaginal erythema, tenderness, bleeding or ulceration.      No vaginal prolapse present.     No vaginal atrophy present.       Right Adnexa: not tender, not full, not palpable, no mass present and not absent.     Left Adnexa: not tender, not full, not palpable, no mass present and not absent.     No cervical motion tenderness, discharge, friability, lesion, polyp, nabothian cyst or eversion.      Uterus is not enlarged, fixed, tender, irregular or prolapsed.      No uterine mass detected.     No urethral tenderness or mass present.      Pelvic exam was performed with patient in the lithotomy position.   Breasts:      Breasts are asymmetrical (right breast smaller than left).      Right: No swelling, bleeding, inverted nipple, mass, nipple discharge, skin change, " tenderness, axillary adenopathy or supraclavicular adenopathy.      Left: No swelling, bleeding, inverted nipple, mass, nipple discharge, skin change, tenderness, axillary adenopathy or supraclavicular adenopathy.      Breast exam comments: scarring throughout right breast and right axilla from prior surgical procedures.      HENT:      Head: Normocephalic and atraumatic.   Eyes:      Pupils: Pupils are equal, round, and reactive to light.   Cardiovascular:      Rate and Rhythm: Normal rate.   Pulmonary:      Effort: Pulmonary effort is normal.   Abdominal:      General: There is no distension.      Palpations: Abdomen is soft. There is no mass.      Tenderness: There is no abdominal tenderness. There is no guarding.      Hernia: No hernia is present. There is no hernia in the left inguinal area or right inguinal area.   Musculoskeletal:         General: Normal range of motion.      Cervical back: Normal range of motion and neck supple. No tenderness.   Lymphadenopathy:      Cervical: No cervical adenopathy.      Upper Body:      Right upper body: No supraclavicular, axillary or pectoral adenopathy.      Left upper body: No supraclavicular, axillary or pectoral adenopathy.      Lower Body: No right inguinal adenopathy. No left inguinal adenopathy.   Neurological:      General: No focal deficit present.      Mental Status: She is alert and oriented to person, place, and time.      Cranial Nerves: No cranial nerve deficit.   Skin:     General: Skin is warm and dry.   Psychiatric:         Mood and Affect: Mood normal.         Behavior: Behavior normal.         Thought Content: Thought content normal.         Judgment: Judgment normal.   Vitals and nursing note reviewed.       Assessment   Diagnoses and all orders for this visit:    1. Women's annual routine gynecological examination (Primary)  -     IGP, Apt HPV,rfx 16 / 18,45    2. Encounter for initial prescription of other contraceptives    3. Vaginal discharge  -      NuSwab VG+ - Swab, Vagina  -     Genital Mycoplasmas MARIAJOSE, Swab - Swab, Vagina    4. Menorrhagia with regular cycle    5. H/O lymphangioma       Plan   1. Well woman exam: Pap collected Yes. Recommend MVI daily.    2. Contraception: tubal ligation   3. STD: Enc condoms. Desires STD screen today- No.   4. Smoking status: nonsmoker  5.  Encouraged annual mammogram screening starting at age 40. Instructed on how to perform SBE. Encouraged breast health self awareness.  6.    Encouraged 150 minutes of exercise per week if not medially contraindicated.   7.    Patient's Body mass index is 24.2 kg/m². indicating that she is within normal range (BMI 18.5-24.9). No BMI management plan needed.   8.    Stressed importance annual mammogram screening, informed any abnormal results would likely indicate the need for diagnostic mammogram and breast u/s. She is not interested in return f/u with Dr Mahmood at this time.  9.   Reviewed IUD options to treat menorrhagia, uses, side effects, risks vs benefits. Discussed placement procedures. She desires to have mirena IUD placed  10. Scant malodorous discharge noted, odor is not fish-like. Otherwise normal pelvic exam. Discussed treating empirically vs waiting for results. She desires to wait for results and treat if indicated. Encouraged cotton only underwear, avoidance of douching and high fragrant soaps. Encouraged to avoid change soaps frequently.     Follow Up one year or PRN    Tika Corcoran, APRN  11/3/2021  11:23 EDT

## 2021-11-05 ENCOUNTER — TELEPHONE (OUTPATIENT)
Dept: OBSTETRICS AND GYNECOLOGY | Facility: CLINIC | Age: 41
End: 2021-11-05

## 2021-11-05 LAB
A VAGINAE DNA VAG QL NAA+PROBE: ABNORMAL SCORE
BVAB2 DNA VAG QL NAA+PROBE: ABNORMAL SCORE
C ALBICANS DNA VAG QL NAA+PROBE: NEGATIVE
C GLABRATA DNA VAG QL NAA+PROBE: NEGATIVE
C TRACH DNA VAG QL NAA+PROBE: NEGATIVE
MEGA1 DNA VAG QL NAA+PROBE: ABNORMAL SCORE
N GONORRHOEA DNA VAG QL NAA+PROBE: NEGATIVE
T VAGINALIS DNA VAG QL NAA+PROBE: NEGATIVE

## 2021-11-05 RX ORDER — METRONIDAZOLE 500 MG/1
500 TABLET ORAL 2 TIMES DAILY
Qty: 14 TABLET | Refills: 0 | Status: SHIPPED | OUTPATIENT
Start: 2021-11-05 | End: 2021-11-12

## 2021-11-05 NOTE — TELEPHONE ENCOUNTER
Reviewed with patient + BV testing. Reviewed negative yeast, trichomonas, gonorrhea, chlamydia testing.  Pap and Myocplasma pending. Flagyl sent to pharmacy. No further questions at this time

## 2021-11-05 NOTE — TELEPHONE ENCOUNTER
----- Message from Jena Baez RN sent at 11/5/2021 11:18 AM EDT -----  Lab results from visit 11/3/21. High results. Tika out today.

## 2021-11-09 ENCOUNTER — TELEPHONE (OUTPATIENT)
Dept: OBSTETRICS AND GYNECOLOGY | Facility: CLINIC | Age: 41
End: 2021-11-09

## 2021-11-09 LAB
CYTOLOGIST CVX/VAG CYTO: ABNORMAL
CYTOLOGY CVX/VAG DOC CYTO: ABNORMAL
CYTOLOGY CVX/VAG DOC THIN PREP: ABNORMAL
DX ICD CODE: ABNORMAL
DX ICD CODE: ABNORMAL
HIV 1 & 2 AB SER-IMP: ABNORMAL
HPV I/H RISK 4 DNA CVX QL PROBE+SIG AMP: POSITIVE
HPV16 DNA CVX QL PROBE+SIG AMP: POSITIVE
HPV18+45 E6+E7 MRNA CVX QL NAA+PROBE: NEGATIVE
OTHER STN SPEC: ABNORMAL
PATHOLOGIST CVX/VAG CYTO: ABNORMAL
STAT OF ADQ CVX/VAG CYTO-IMP: ABNORMAL

## 2021-11-09 NOTE — TELEPHONE ENCOUNTER
Attempted to call Danya Frost to review abnromal pap smear results. No answer, left VM to return my call.    ASCUS +HPV pap with HPV genotyping +16. Recommend colposcopy with Dr Escalona.    Tika Corcoran, KISHA  11/9/21  4:25pm

## 2021-11-10 NOTE — TELEPHONE ENCOUNTER
----- Message from Tamika Escalona MD sent at 11/25/2018  3:21 PM EST -----  I talked to Dr. Ryder and she thinks the patient should go see Julieta Mahmood, one of the new breast surgeons. Do you mind to see if we can get her in to see her?  If we have any trouble let me know. Thanks!   A-T Advancement Flap Text: The defect edges were debeveled with a #15 scalpel blade.  Given the location of the defect, shape of the defect and the proximity to free margins an A-T advancement flap was deemed most appropriate.  Using a sterile surgical marker, an appropriate advancement flap was drawn incorporating the defect and placing the expected incisions within the relaxed skin tension lines where possible.    The area thus outlined was incised deep to adipose tissue with a #15 scalpel blade.  The skin margins were undermined to an appropriate distance in all directions utilizing iris scissors.

## 2021-11-11 NOTE — TELEPHONE ENCOUNTER
Gave pt the available results for bacterial vaginosis. The genital mycoplasmas test results not back. Pt request call back from KISHA Valdes.

## 2021-11-12 ENCOUNTER — TELEPHONE (OUTPATIENT)
Dept: OBSTETRICS AND GYNECOLOGY | Facility: CLINIC | Age: 41
End: 2021-11-12

## 2021-11-22 ENCOUNTER — CLINICAL SUPPORT (OUTPATIENT)
Dept: OBSTETRICS AND GYNECOLOGY | Facility: CLINIC | Age: 41
End: 2021-11-22

## 2021-11-22 ENCOUNTER — TELEPHONE (OUTPATIENT)
Dept: OBSTETRICS AND GYNECOLOGY | Facility: CLINIC | Age: 41
End: 2021-11-22

## 2021-11-22 DIAGNOSIS — R39.9 UTI SYMPTOMS: Primary | ICD-10-CM

## 2021-11-22 LAB
BILIRUB BLD-MCNC: NEGATIVE MG/DL
CLARITY, POC: CLEAR
COLOR UR: YELLOW
EXPIRATION DATE: ABNORMAL
GLUCOSE UR STRIP-MCNC: NEGATIVE MG/DL
KETONES UR QL: NEGATIVE
LEUKOCYTE EST, POC: ABNORMAL
Lab: ABNORMAL
NITRITE UR-MCNC: POSITIVE MG/ML
PH UR: 5.5 [PH] (ref 5–8)
PROT UR STRIP-MCNC: ABNORMAL MG/DL
RBC # UR STRIP: ABNORMAL /UL
SP GR UR: 1.03 (ref 1–1.03)
UROBILINOGEN UR QL: NORMAL

## 2021-11-22 PROCEDURE — 81003 URINALYSIS AUTO W/O SCOPE: CPT | Performed by: OBSTETRICS & GYNECOLOGY

## 2021-11-22 NOTE — TELEPHONE ENCOUNTER
Pt calling she states she has developed UTI symptoms over the weekend,  C/o pressure with urination. Please advise.

## 2021-11-22 NOTE — TELEPHONE ENCOUNTER
Please ask her to come in for clean catch, UA.  If UA + for leukocytes, nitrites and/or blood please send culture. If + for blood please also send micro UA. Thanks!

## 2021-11-23 ENCOUNTER — TELEPHONE (OUTPATIENT)
Dept: OBSTETRICS AND GYNECOLOGY | Facility: CLINIC | Age: 41
End: 2021-11-23

## 2021-11-23 RX ORDER — NITROFURANTOIN 25; 75 MG/1; MG/1
100 CAPSULE ORAL 2 TIMES DAILY
Qty: 14 CAPSULE | Refills: 0 | Status: SHIPPED | OUTPATIENT
Start: 2021-11-23 | End: 2021-11-30

## 2021-11-23 NOTE — TELEPHONE ENCOUNTER
Patient notified medication has been sent to pharmacy for UTI. Discussed will await cultures to make sure antibiotic will work for her infection. If she has any problems tolerating meds or fever over 100.4, severe back pain, vomiting or any other problems to call us back.

## 2021-11-23 NOTE — TELEPHONE ENCOUNTER
Please inform patient that her UA suggests a UTI.  I will send in Macrobid to her pharmacy. We are awaiting culture and sensitivities. Will call if we need to change antibiotic.  Recommend calling office if she cannot tolerate antibiotics or seeking further evaluation with a fever (temp of 100.4 or higher), severe back pain, vomiting or other concerning symptom.

## 2021-11-24 LAB
APPEARANCE UR: ABNORMAL
BACTERIA #/AREA URNS HPF: ABNORMAL /[HPF]
BILIRUB UR QL STRIP: NEGATIVE
CASTS URNS QL MICRO: ABNORMAL /LPF
COLOR UR: YELLOW
EPI CELLS #/AREA URNS HPF: >10 /HPF (ref 0–10)
GLUCOSE UR QL: NEGATIVE
HGB UR QL STRIP: ABNORMAL
KETONES UR QL STRIP: NEGATIVE
LEUKOCYTE ESTERASE UR QL STRIP: ABNORMAL
MICRO URNS: ABNORMAL
NITRITE UR QL STRIP: POSITIVE
PH UR STRIP: 5.5 [PH] (ref 5–7.5)
PROT UR QL STRIP: ABNORMAL
RBC #/AREA URNS HPF: ABNORMAL /HPF (ref 0–2)
SP GR UR: 1.02 (ref 1–1.03)
UROBILINOGEN UR STRIP-MCNC: 1 MG/DL (ref 0.2–1)
WBC #/AREA URNS HPF: >30 /HPF (ref 0–5)

## 2021-11-25 LAB
BACTERIA UR CULT: NORMAL
BACTERIA UR CULT: NORMAL

## 2021-12-14 ENCOUNTER — OFFICE VISIT (OUTPATIENT)
Dept: OBSTETRICS AND GYNECOLOGY | Facility: CLINIC | Age: 41
End: 2021-12-14

## 2021-12-14 VITALS
DIASTOLIC BLOOD PRESSURE: 72 MMHG | SYSTOLIC BLOOD PRESSURE: 108 MMHG | BODY MASS INDEX: 24.75 KG/M2 | WEIGHT: 144.2 LBS | HEART RATE: 73 BPM

## 2021-12-14 DIAGNOSIS — N93.9 ABNORMAL UTERINE BLEEDING (AUB): Primary | ICD-10-CM

## 2021-12-14 DIAGNOSIS — N87.9 CERVICAL DYSPLASIA: ICD-10-CM

## 2021-12-14 PROBLEM — A40.9 STREPTOCOCCAL SEPSIS (HCC): Status: ACTIVE | Noted: 2021-06-14

## 2021-12-14 PROBLEM — E03.9 HYPOTHYROIDISM: Status: ACTIVE | Noted: 2017-12-08

## 2021-12-14 PROCEDURE — 99213 OFFICE O/P EST LOW 20 MIN: CPT | Performed by: OBSTETRICS & GYNECOLOGY

## 2021-12-14 NOTE — PROGRESS NOTES
SUBJECTIVE:   Chief Complaint   Patient presents with   • Consult     consult regarding abnormal pap smear.         Danya Frost is a 41 y.o.  who presents for discussion of treatment/management of dysplasia and AUB  Reports she had difficulty recovering from cervical conization/LEEP. She is also desiring decreased in her bleeding although it is not irregular. She is concerned that the continued management with more conservative procedures may not be the best option.      Dysplasia hx:  2021 ASCUS, HPV positive    2018 Conization: ODESSA 2 at 6-9 o'clock extending to margins (EMB at that time normal)     May 2018 ODESSA 2 at 1 o'clock    May 2018 LSIL, HPV positive    2016 ECC normal    2016 LSIL, HPV positive    Breast imaging considerations:   She was seen by Dr. Mahmood. After imaging in , consideration was made to breast biopsy vs conservative management with short follow up imaging. She most recently had a cancelled appointment with Dr. Mahmood in . She has considered going back, but she has been concerned with regards to the cost of the imaging/follow up procedures. She denies any breast changes or concerns.     Past Medical History:   Diagnosis Date   • Hard to intubate    • History of MRSA infection 2002    RIGHT BREAST AFTER BREAST IMPLANTS. TREATED AT [facility]   • Hypothyroid    • Lymphangioma      Past Surgical History:   Procedure Laterality Date   • BREAST AUGMENTATION Right    • BREAST BIOPSY     • BREAST IMPLANT REMOVAL Right    • CERVICAL CONIZATION N/A 2018    Procedure: CERVICAL CONIZATION;  Surgeon: Tamika Escalona MD;  Location: [facility];  Service: Obstetrics/Gynecology   • D & C HYSTEROSCOPY N/A 2018    Procedure: DILATATION AND CURETTAGE HYSTEROSCOPY WITH Myosure;  Surgeon: Tamika Escalona MD;  Location: [facility];  Service: Obstetrics/Gynecology   • FINGER SURGERY     • MANDIBLE SURGERY     • MOUTH SURGERY     • NECK SURGERY      • OTHER SURGICAL HISTORY      MULTIPLE SURGERIES TO REMOVE ANGIOMAS   • TONGUE SURGERY     • TUBAL ABDOMINAL LIGATION       OB History    Para Term  AB Living   2 2 2     2   SAB IAB Ectopic Molar Multiple Live Births             1      # Outcome Date GA Lbr Ephraim/2nd Weight Sex Delivery Anes PTL Lv   2 Term     M Vag-Spont      1 Term     M Vag-Spont   COLLIN      Social History     Tobacco Use   • Smoking status: Never Smoker   • Smokeless tobacco: Never Used   Substance Use Topics   • Alcohol use: Yes     Comment: social   • Drug use: No     Family History   Problem Relation Age of Onset   • Breast cancer Neg Hx    • Ovarian cancer Neg Hx    • Uterine cancer Neg Hx    • Colon cancer Neg Hx    • Malig Hyperthermia Neg Hx      Current Outpatient Medications on File Prior to Visit   Medication Sig Dispense Refill   • levonorgestrel (MIRENA) 20 MCG/24HR IUD To be inserted one time by prescriber. 1 each 0   • SYNTHROID 175 MCG tablet Take 175 mcg by mouth Daily.       No current facility-administered medications on file prior to visit.     Allergies   Allergen Reactions   • Latex Irritability     Other reaction(s): Irritability        Review of Systems  See HPI    OBJECTIVE:   Vitals:    21 0902   BP: 108/72   Pulse: 73   Weight: 65.4 kg (144 lb 3.2 oz)      Physical Exam  Constitutional:       General: She is not in acute distress.     Appearance: She is well-developed. She is not diaphoretic.   HENT:      Head: Normocephalic and atraumatic.   Eyes:      General: No scleral icterus.     Extraocular Movements: Extraocular movements intact.   Pulmonary:      Effort: Pulmonary effort is normal. No respiratory distress.   Skin:     General: Skin is warm and dry.   Neurological:      General: No focal deficit present.      Mental Status: She is alert and oriented to person, place, and time.   Psychiatric:         Mood and Affect: Mood normal.         Behavior: Behavior normal.         Thought Content:  Thought content normal.         Judgment: Judgment normal.         ASSESSMENT/PLAN:     ICD-10-CM ICD-9-CM   1. Abnormal uterine bleeding (AUB)  N93.9 626.9   2. Cervical dysplasia  N87.9 622.10       Dysplasia:   - most recent pap smear ASCUS HPV positive. Reviewed based on ASCCP guidance, risk of ODESSA 3 is 10%.  Recommend colposcopy per ASCCP algorithm.  Discussed advantages of this over hysterectomy including less recovery, less risk of injury to surrounding structures, less risk of anesthesia. Reviewed hysterectomy can be definitive for low grade dysplasia but in cases of CIS or cancer, more procedures may be required such as removal of ovaries, radiation.  We discussed that hysterectomy even if done laparoscopically can have risks including but not limited to  risks of anesthesia, injury to surrounding structures, infection, bleeding, need for other surgeries/procedures, need for laparotomy (larger incision).  After discussing the risks and benefits of colposcopy versus hysterectomy, she is decided to proceed with colposcopy.  She understands continued surveillance would be indicated.    Abnormal uterine bleeding:   Patient would prefer to also manage this with the least invasive option.  We discussed that hysterectomy could manage her abnormal uterine bleeding however Mirena does have an amenorrhea rate of 40%.  She would prefer for Mirena IUD insertion which we can do at the time of colposcopy.  Will return for these procedures.    No orders of the defined types were placed in this encounter.      Return in about 2 weeks (around 12/28/2021) for colpo, IUD insertion.

## 2021-12-28 ENCOUNTER — PROCEDURE VISIT (OUTPATIENT)
Dept: OBSTETRICS AND GYNECOLOGY | Facility: CLINIC | Age: 41
End: 2021-12-28

## 2021-12-28 VITALS — WEIGHT: 144 LBS | HEIGHT: 64 IN | BODY MASS INDEX: 24.59 KG/M2

## 2021-12-28 DIAGNOSIS — R87.610 ASCUS WITH POSITIVE HIGH RISK HPV CERVICAL: ICD-10-CM

## 2021-12-28 DIAGNOSIS — Z11.3 SCREEN FOR STD (SEXUALLY TRANSMITTED DISEASE): ICD-10-CM

## 2021-12-28 DIAGNOSIS — R87.810 ASCUS WITH POSITIVE HIGH RISK HPV CERVICAL: ICD-10-CM

## 2021-12-28 DIAGNOSIS — N93.9 ABNORMAL UTERINE BLEEDING (AUB): Primary | ICD-10-CM

## 2021-12-28 LAB
B-HCG UR QL: NEGATIVE
EXPIRATION DATE: NORMAL
INTERNAL NEGATIVE CONTROL: NEGATIVE
INTERNAL POSITIVE CONTROL: POSITIVE
Lab: NORMAL

## 2021-12-28 PROCEDURE — 81025 URINE PREGNANCY TEST: CPT | Performed by: OBSTETRICS & GYNECOLOGY

## 2021-12-28 PROCEDURE — 57456 ENDOCERV CURETTAGE W/SCOPE: CPT | Performed by: OBSTETRICS & GYNECOLOGY

## 2021-12-28 PROCEDURE — 58300 INSERT INTRAUTERINE DEVICE: CPT | Performed by: OBSTETRICS & GYNECOLOGY

## 2021-12-28 NOTE — PROGRESS NOTES
Colposcopy Procedure Note    Indications:   Nov 2021 ASCUS, HPV positive                          Nov 2018 Conization: ODESSA 2 at 6-9 o'clock extending to margins (EMB at that time normal)                                  May 2018 ODESSA 2 at 1 o'clock                          May 2018 LSIL, HPV positive                          Sept 2016 ECC normal                          Sept 2016 LSIL, HPV positive    History:  Social History     Tobacco Use   Smoking Status Never Smoker   Smokeless Tobacco Never Used     Contraception: Desires MIrena IUD    Procedure Details   The risks and benefits of the procedure and Verbal informed consent obtained.  Urine pregnancy test was done and was negative    Speculum placed in vagina and excellent visualization of cervix achieved, cervix swabbed x 3 with acetic acid solution.  360degree visualization of BUNNY adequate with Q tip    Findings:  Cervix: no visible lesions; endocervical curettage performed and specimen labelled and sent to pathology.  Vaginal inspection: vaginal colposcopy not performed.  Vulvar colposcopy: vulvar colposcopy not performed.    Specimens: ECC    Complications: none.  Patient tolerated procedure well.    Impression:  Normal colposocpy    Plan:  Specimens labelled and sent to Pathology.  Will base further treatment on Pathology findings.  Patient will be called with results.  Patient was instructed if she is not called in 10 days to call the office to discuss results and follow up.

## 2021-12-29 NOTE — PROGRESS NOTES
Mirena IUD Insertion Procedure Note    Indications: Desires contraception with Mirena IUD, AUB    Pre Procedural Diagnosis: Desires contraception    Post Procedural Diagnosis: Same    Counseling:  Patient was counseled on risks of IUD insertion including but not limited to abnormal bleeding, infection, uterine perforation, expulsion, failure of contraception resulting in pregnancy, need for additional procedures and/or need for surgery.  All questions were answered to apparent satisfaction.  She was counseled about the duration of treatment, recommended removal in 5-7 years.  She was advised to perform monthly string checks and to see evaluation with unexpected changes in bleeding patterns and/or unable to feel the strings.      Procedure Details   Urine pregnancy test was done, and result was negative.  Gonorrhea/chlamydia testing was done and result was pending .    Bimanual exam revealed Anteverted. Cervix cleansed with Betadine. Tenaculum applied to the anterior lip.  Uterus sounded to 8 cm. IUD inserted without difficulty. String visible and trimmed. Patient tolerated procedure well.    IUD Information:  See medication record.    Condition:  Stable    Complications:  None    Plan:    The patient was advised to call for any fever or for prolonged or severe pain or bleeding; she was also advised to use a back up method of contraception for 7 days or abstain from intercourse. She was advised to use OTC analgesis as needed for mild to moderate pain.  Return to the clinic in 4 weeks for follow-up.    Referred for ultrasound following to confirm position

## 2021-12-30 ENCOUNTER — TELEPHONE (OUTPATIENT)
Dept: OBSTETRICS AND GYNECOLOGY | Facility: CLINIC | Age: 41
End: 2021-12-30

## 2021-12-30 LAB
C TRACH RRNA SPEC QL NAA+PROBE: NEGATIVE
DX ICD CODE: NORMAL
DX ICD CODE: NORMAL
N GONORRHOEA RRNA SPEC QL NAA+PROBE: NEGATIVE
PATH REPORT.FINAL DX SPEC: NORMAL
PATH REPORT.GROSS SPEC: NORMAL
PATH REPORT.SITE OF ORIGIN SPEC: NORMAL
PATHOLOGIST NAME: NORMAL
PAYMENT PROCEDURE: NORMAL
T VAGINALIS DNA SPEC QL NAA+PROBE: NEGATIVE

## 2022-01-04 ENCOUNTER — TELEPHONE (OUTPATIENT)
Dept: OBSTETRICS AND GYNECOLOGY | Facility: CLINIC | Age: 42
End: 2022-01-04

## 2022-01-04 RX ORDER — NITROFURANTOIN 25; 75 MG/1; MG/1
100 CAPSULE ORAL 2 TIMES DAILY
Qty: 14 CAPSULE | Refills: 0 | Status: SHIPPED | OUTPATIENT
Start: 2022-01-04 | End: 2022-01-11

## 2022-01-04 NOTE — TELEPHONE ENCOUNTER
Juan Jose Escalona,  Pt had colpo and and iud inserted last week. She stated that she is certain she has a uti as she is prone to them. Would you be able to send meds to her pharmacy or should she be evaluated?  Thank you,  Maria T    Pharmacy:   [facility] [address] [place] [address] [address] - [phone]  - [phone] FX

## 2022-12-21 ENCOUNTER — TELEPHONE (OUTPATIENT)
Dept: OBSTETRICS AND GYNECOLOGY | Facility: CLINIC | Age: 42
End: 2022-12-21

## 2023-02-24 ENCOUNTER — OFFICE VISIT (OUTPATIENT)
Dept: OBSTETRICS AND GYNECOLOGY | Facility: CLINIC | Age: 43
End: 2023-02-24
Payer: COMMERCIAL

## 2023-02-24 VITALS
BODY MASS INDEX: 24.34 KG/M2 | WEIGHT: 142.6 LBS | DIASTOLIC BLOOD PRESSURE: 82 MMHG | SYSTOLIC BLOOD PRESSURE: 137 MMHG | HEART RATE: 92 BPM | HEIGHT: 64 IN

## 2023-02-24 DIAGNOSIS — Z01.419 WELL WOMAN EXAM WITH ROUTINE GYNECOLOGICAL EXAM: Primary | ICD-10-CM

## 2023-02-24 PROCEDURE — 99396 PREV VISIT EST AGE 40-64: CPT | Performed by: OBSTETRICS & GYNECOLOGY

## 2023-03-02 ENCOUNTER — TELEPHONE (OUTPATIENT)
Dept: OBSTETRICS AND GYNECOLOGY | Facility: CLINIC | Age: 43
End: 2023-03-02
Payer: COMMERCIAL

## 2023-03-02 ENCOUNTER — TELEPHONE (OUTPATIENT)
Dept: OBSTETRICS AND GYNECOLOGY | Facility: CLINIC | Age: 43
End: 2023-03-02

## 2023-03-02 LAB
CYTOLOGIST CVX/VAG CYTO: ABNORMAL
CYTOLOGY CVX/VAG DOC CYTO: ABNORMAL
CYTOLOGY CVX/VAG DOC THIN PREP: ABNORMAL
DX ICD CODE: ABNORMAL
HIV 1 & 2 AB SER-IMP: ABNORMAL
HPV I/H RISK 4 DNA CVX QL PROBE+SIG AMP: POSITIVE
HPV16 DNA CVX QL PROBE+SIG AMP: NEGATIVE
HPV18+45 E6+E7 MRNA CVX QL NAA+PROBE: NEGATIVE
OTHER STN SPEC: ABNORMAL
STAT OF ADQ CVX/VAG CYTO-IMP: ABNORMAL

## 2023-03-02 NOTE — PROGRESS NOTES
Dr. Pola Palmer patient. [facility] a few years ago. 2021 with abnormal and HPV effect on biopsy. Repeat pap with Dr. Escalona shows NIL, HPV +, 16/18/45 negative. Left message to call office, but needs colpo for persistent HPV +. (If you talk with her). Thanks, Dr. Bray

## 2023-03-02 NOTE — TELEPHONE ENCOUNTER
Spoke to pt, she prefers to wait until Dr. Escalona returns. Appt given for 06/13/2023.    Estela

## 2023-03-02 NOTE — TELEPHONE ENCOUNTER
----- Message from Jersey Bray MD sent at 3/2/2023  4:38 PM EST -----  Dr. Pola Palmer patient. [facility] a few years ago. 2021 with abnormal and HPV effect on biopsy. Repeat pap with Dr. Escalona shows NIL, HPV +, 16/18/45 negative. Left message to call office, but needs colpo for persistent HPV +. (If you talk with her). Thanks, Dr. Bray

## 2023-03-02 NOTE — TELEPHONE ENCOUNTER
Caller: Danya Frost    Relationship: Self    Best call back number: [phone]    Caller requesting test results: SELF    What test was performed: PAP     When was the test performed: 02/24    Where was the test performed:     Additional notes: OK TO CALLBACK ANYTIME, OK TO LEAVE A .

## 2023-03-07 ENCOUNTER — TELEPHONE (OUTPATIENT)
Dept: OBSTETRICS AND GYNECOLOGY | Facility: CLINIC | Age: 43
End: 2023-03-07
Payer: COMMERCIAL

## 2023-03-07 NOTE — TELEPHONE ENCOUNTER
----- Message from Jersey Bray MD sent at 3/7/2023 12:56 PM EST -----  Estela, reviewed with patient. Persistent HPV present (was HPV effect last biopsy one year ago). Per patient she plans on exam/follow up with Dr. Escalona in June to consider next steps.  This is reasonable. Thanks, Dr. Bray

## 2023-03-07 NOTE — PROGRESS NOTES
Estela, reviewed with patient. Persistent HPV present (was HPV effect last biopsy one year ago). Per patient she plans on exam/follow up with Dr. Escalona in June to consider next steps.  This is reasonable. Thanks, Dr. Bray

## 2023-03-15 ENCOUNTER — TELEPHONE (OUTPATIENT)
Dept: OBSTETRICS AND GYNECOLOGY | Facility: CLINIC | Age: 43
End: 2023-03-15
Payer: COMMERCIAL

## 2023-03-15 NOTE — TELEPHONE ENCOUNTER
Spoke with Danya to let her know that Dr Bray said that Dr Escalona has received your information about your recent visit to orthopedist. Ok to review you and Dr Escalona at your AE 6/13/23. Thank  you

## 2023-03-15 NOTE — TELEPHONE ENCOUNTER
Jena,     Dr. Escalona received information about recent visit to orthopedist. Okay to review with patient at next visit with Dr. Escalona.     Thanks,   Dr. Bray

## 2023-03-21 ENCOUNTER — TELEPHONE (OUTPATIENT)
Dept: OBSTETRICS AND GYNECOLOGY | Facility: CLINIC | Age: 43
End: 2023-03-21
Payer: COMMERCIAL

## 2023-03-21 RX ORDER — SULFAMETHOXAZOLE AND TRIMETHOPRIM 800; 160 MG/1; MG/1
1 TABLET ORAL 2 TIMES DAILY
Qty: 6 TABLET | Refills: 0 | Status: SHIPPED | OUTPATIENT
Start: 2023-03-21 | End: 2023-03-24

## 2023-03-21 NOTE — TELEPHONE ENCOUNTER
Ifeoma,     Rx sent to treat.   Unable to know for sure if is UTI and if the correct antibiotic was chosen without culture. So if does not improve will need visit to go over options.     Thanks,   Dr. Bray

## 2023-03-21 NOTE — TELEPHONE ENCOUNTER
Dr. Escalona pt states she believes she has UTI. Symptoms include a lot of pressure and tugging with urination, states this is common symptom she experiences with past uti's. No burning, or discharge. States she has been taking gabapentin and feels this may be cause of uti. Requesting medication to treat please be sent to pharmacy, will leave urine if you would prefer pt come in.     Please let me know if you would like pt to come in to leave urine sample.     Please advise,   Thank you    Pharmacy confirmed - [facility] [id number] - [place], [place] - [address] [place] [address] [address] - [phone]  - [phone] FX

## 2023-03-21 NOTE — TELEPHONE ENCOUNTER
Provider: DR. ELMA GARIBAY  Caller: SHADY WISE  Relationship to Patient: SELF  Ph[facility]acy: [facility]  Phone Number: [phone]  Reason for Call: UTI - REACCURRING  When did it start: A FEW WEEKS AGO    PT SAID THAT A UTI HAS BEEN COMING ON FOR ABOUT 3 WEEKS - SYMPTOMS WENT AWAY AND THEN CAME BACK - THE PT IS HAVING PAINFUL URINATION - NOT BURNING - URINE IS A MED TO DARK YELLOW - PT HAS BEEN TAKING GABAPENTIN FOR NERVE PAIN AND IS WANTING TO KNOW IF MAYBE THAT MEDICATION CAN CAUSE UTI'S?    PT IS WANTING TO KNOW IF SOMETHING CAN BE CALLED INTO HER PHARMACY OR IF SHE NEEDS TO COME IN TO BE SEEN - PLEASE CALL THE PT TO LET HER KNOW EITHER WAY    THANK YOU!

## 2023-04-12 ENCOUNTER — PRE-ADMISSION TESTING (OUTPATIENT)
Dept: PREADMISSION TESTING | Facility: HOSPITAL | Age: 43
End: 2023-04-12
Payer: COMMERCIAL

## 2023-04-12 VITALS
BODY MASS INDEX: 24.34 KG/M2 | WEIGHT: 142.6 LBS | SYSTOLIC BLOOD PRESSURE: 118 MMHG | DIASTOLIC BLOOD PRESSURE: 71 MMHG | RESPIRATION RATE: 20 BRPM | HEIGHT: 64 IN | TEMPERATURE: 98 F | HEART RATE: 86 BPM | OXYGEN SATURATION: 97 %

## 2023-04-12 LAB
ALBUMIN SERPL-MCNC: 4.3 G/DL (ref 3.5–5.2)
ALBUMIN/GLOB SERPL: 2 G/DL
ALP SERPL-CCNC: 71 U/L (ref 39–117)
ALT SERPL W P-5'-P-CCNC: 15 U/L (ref 1–33)
ANION GAP SERPL CALCULATED.3IONS-SCNC: 5 MMOL/L (ref 5–15)
AST SERPL-CCNC: 9 U/L (ref 1–32)
BILIRUB SERPL-MCNC: 0.6 MG/DL (ref 0–1.2)
BUN SERPL-MCNC: 12 MG/DL (ref 6–20)
BUN/CREAT SERPL: 14.3 (ref 7–25)
CALCIUM SPEC-SCNC: 9.4 MG/DL (ref 8.6–10.5)
CHLORIDE SERPL-SCNC: 102 MMOL/L (ref 98–107)
CO2 SERPL-SCNC: 29 MMOL/L (ref 22–29)
CREAT SERPL-MCNC: 0.84 MG/DL (ref 0.57–1)
DEPRECATED RDW RBC AUTO: 38.6 FL (ref 37–54)
EGFRCR SERPLBLD CKD-EPI 2021: 88 ML/MIN/1.73
ERYTHROCYTE [DISTWIDTH] IN BLOOD BY AUTOMATED COUNT: 11.9 % (ref 12.3–15.4)
GLOBULIN UR ELPH-MCNC: 2.2 GM/DL
GLUCOSE SERPL-MCNC: 100 MG/DL (ref 65–99)
HCT VFR BLD AUTO: 38.3 % (ref 34–46.6)
HGB BLD-MCNC: 13 G/DL (ref 12–15.9)
MCH RBC QN AUTO: 30.2 PG (ref 26.6–33)
MCHC RBC AUTO-ENTMCNC: 33.9 G/DL (ref 31.5–35.7)
MCV RBC AUTO: 88.9 FL (ref 79–97)
PLATELET # BLD AUTO: 291 10*3/MM3 (ref 140–450)
PMV BLD AUTO: 9.8 FL (ref 6–12)
POTASSIUM SERPL-SCNC: 4.1 MMOL/L (ref 3.5–5.2)
PROT SERPL-MCNC: 6.5 G/DL (ref 6–8.5)
RBC # BLD AUTO: 4.31 10*6/MM3 (ref 3.77–5.28)
SODIUM SERPL-SCNC: 136 MMOL/L (ref 136–145)
WBC NRBC COR # BLD: 8.1 10*3/MM3 (ref 3.4–10.8)

## 2023-04-12 PROCEDURE — 80053 COMPREHEN METABOLIC PANEL: CPT

## 2023-04-12 PROCEDURE — 93005 ELECTROCARDIOGRAM TRACING: CPT

## 2023-04-12 PROCEDURE — 36415 COLL VENOUS BLD VENIPUNCTURE: CPT

## 2023-04-12 PROCEDURE — 85027 COMPLETE CBC AUTOMATED: CPT

## 2023-04-12 NOTE — DISCHARGE INSTRUCTIONS
Take the following medications the morning of surgery:    SYNTHROID    ARRIVE TO OUTPATIENT SURGERY CENTER 4/20/23 AT 8:00AM      If you are on prescription narcotic pain medication to control your pain you may also take that medication the morning of surgery.    General Instructions:  Do not eat solid food after midnight the night before surgery.  You may drink clear liquids day of surgery but must stop at least one hour before your hospital arrival time.  It is beneficial for you to have a clear drink that contains carbohydrates the day of surgery.  We suggest a 12 to 20 ounce bottle of Gatorade or Powerade for non-diabetic patients or a 12 to 20 ounce bottle of G2 or Powerade Zero for diabetic patients. (Pediatric patients, are not advised to drink a 12 to 20 ounce carbohydrate drink)    Clear liquids are liquids you can see through.  Nothing red in color.     Plain water                               Sports drinks  Sodas                                   Gelatin (Jell-O)  Fruit juices without pulp such as white grape juice and apple juice  Popsicles that contain no fruit or yogurt  Tea or coffee (no cream or milk added)  Gatorade / Powerade  G2 / Powerade Zero    Infants may have breast milk up to four hours before surgery.  Infants drinking formula may drink formula up to six hours before surgery.   Patients who avoid smoking, chewing tobacco and alcohol for 4 weeks prior to surgery have a reduced risk of post-operative complications.  Quit smoking as many days before surgery as you can.  Do not smoke, use chewing tobacco or drink alcohol the day of surgery.   If applicable bring your C-PAP/ BI-PAP machine in with you to preop day of surgery.  Bring any papers given to you in the doctor’s office.  Wear clean comfortable clothes.  Do not wear contact lenses, false eyelashes or make-up.  Bring a case for your glasses.   Bring crutches or walker if applicable.  Remove all piercings.  Leave jewelry and any other  valuables at home.  Hair extensions with metal clips must be removed prior to surgery.  The Pre-Admission Testing nurse will instruct you to bring medications if unable to obtain an accurate list in Pre-Admission Testing.        Preventing a Surgical Site Infection:  For 2 to 3 days before surgery, avoid shaving with a razor because the razor can irritate skin and make it easier to develop an infection.    Any areas of open skin can increase the risk of a post-operative wound infection by allowing bacteria to enter and travel throughout the body.  Notify your surgeon if you have any skin wounds / rashes even if it is not near the expected surgical site.  The area will need assessed to determine if surgery should be delayed until it is healed.  The night prior to surgery shower using a fresh bar of anti-bacterial soap (such as Dial) and clean washcloth.  Sleep in a clean bed with clean clothing.  Do not allow pets to sleep with you.  Shower on the morning of surgery using a fresh bar of anti-bacterial soap (such as Dial) and clean washcloth.  Dry with a clean towel and dress in clean clothing.  Ask your surgeon if you will be receiving antibiotics prior to surgery.  Make sure you, your family, and all healthcare providers clean their hands with soap and water or an alcohol based hand  before caring for you or your wound.    Day of surgery:  Your arrival time is approximately two hours before your scheduled surgery time.  Upon arrival, a Pre-op nurse and Anesthesiologist will review your health history, obtain vital signs, and answer questions you may have.  The only belongings needed at this time will be a list of your home medications and if applicable your C-PAP/BI-PAP machine.  A Pre-op nurse will start an IV and you may receive medication in preparation for surgery, including something to help you relax.     Please be aware that surgery does come with discomfort.  We want to make every effort to control  your discomfort so please discuss any uncontrolled symptoms with your nurse.   Your doctor will most likely have prescribed pain medications.      If you are going home after surgery you will receive individualized written care instructions before being discharged.  A responsible adult must drive you to and from the hospital on the day of your surgery and stay with you for 24 hours.  Discharge prescriptions can be filled by the hospital pharmacy during regular pharmacy hours.  If you are having surgery late in the day/evening your prescription may be e-prescribed to your pharmacy.  Please verify your pharmacy hours or chose a 24 hour pharmacy to avoid not having access to your prescription because your pharmacy has closed for the day.    If you are staying overnight following surgery, you will be transported to your hospital room following the recovery period.  [facility] has all private rooms.    If you have any questions please call Pre-Admission Testing at [phone].  Deductibles and co-payments are collected on the day of service. Please be prepared to pay the required co-pay, deductible or deposit on the day of service as defined by your plan.    Call your surgeon immediately if you experience any of the following symptoms:  Sore Throat  Shortness of Breath or difficulty breathing  Cough  Chills  Body soreness or muscle pain  Headache  Fever  New loss of taste or smell  Do not arrive for your surgery ill.  Your procedure will need to be rescheduled to another time.  You will need to call your physician before the day of surgery to avoid any unnecessary exposure to hospital staff as well as other patients.

## 2023-04-13 LAB — QT INTERVAL: 382 MS

## 2023-04-20 ENCOUNTER — ANESTHESIA EVENT (OUTPATIENT)
Dept: PERIOP | Facility: HOSPITAL | Age: 43
End: 2023-04-20
Payer: COMMERCIAL

## 2023-04-20 ENCOUNTER — ANESTHESIA (OUTPATIENT)
Dept: PERIOP | Facility: HOSPITAL | Age: 43
End: 2023-04-20
Payer: COMMERCIAL

## 2023-04-20 ENCOUNTER — HOSPITAL ENCOUNTER (OUTPATIENT)
Facility: HOSPITAL | Age: 43
Setting detail: HOSPITAL OUTPATIENT SURGERY
Discharge: HOME | End: 2023-04-20
Attending: ORTHOPAEDIC SURGERY | Admitting: ORTHOPAEDIC SURGERY
Payer: COMMERCIAL

## 2023-04-20 VITALS
OXYGEN SATURATION: 100 % | HEART RATE: 73 BPM | RESPIRATION RATE: 15 BRPM | SYSTOLIC BLOOD PRESSURE: 118 MMHG | TEMPERATURE: 98.4 F | DIASTOLIC BLOOD PRESSURE: 76 MMHG

## 2023-04-20 DIAGNOSIS — G56.22 CUBITAL TUNNEL SYNDROME ON LEFT: Primary | ICD-10-CM

## 2023-04-20 PROCEDURE — 25010000002 PROPOFOL 10 MG/ML EMULSION: Performed by: NURSE ANESTHETIST, CERTIFIED REGISTERED

## 2023-04-20 PROCEDURE — 81025 URINE PREGNANCY TEST: CPT | Performed by: ANESTHESIOLOGY

## 2023-04-20 PROCEDURE — 25010000002 DEXAMETHASONE SODIUM PHOSPHATE 20 MG/5ML SOLUTION: Performed by: NURSE ANESTHETIST, CERTIFIED REGISTERED

## 2023-04-20 PROCEDURE — 0 LIDOCAINE 1 % SOLUTION 20 ML VIAL: Performed by: ORTHOPAEDIC SURGERY

## 2023-04-20 PROCEDURE — 25010000002 CEFAZOLIN IN DEXTROSE 2-4 GM/100ML-% SOLUTION: Performed by: ORTHOPAEDIC SURGERY

## 2023-04-20 PROCEDURE — 25010000002 FENTANYL CITRATE (PF) 100 MCG/2ML SOLUTION: Performed by: NURSE ANESTHETIST, CERTIFIED REGISTERED

## 2023-04-20 RX ORDER — SODIUM CHLORIDE 0.9 % (FLUSH) 0.9 %
3-10 SYRINGE (ML) INJECTION AS NEEDED
Status: DISCONTINUED | OUTPATIENT
Start: 2023-04-20 | End: 2023-04-20 | Stop reason: HOSPADM

## 2023-04-20 RX ORDER — DIPHENHYDRAMINE HYDROCHLORIDE 50 MG/ML
12.5 INJECTION INTRAMUSCULAR; INTRAVENOUS
Status: DISCONTINUED | OUTPATIENT
Start: 2023-04-20 | End: 2023-04-20 | Stop reason: HOSPADM

## 2023-04-20 RX ORDER — DROPERIDOL 2.5 MG/ML
0.62 INJECTION, SOLUTION INTRAMUSCULAR; INTRAVENOUS
Status: DISCONTINUED | OUTPATIENT
Start: 2023-04-20 | End: 2023-04-20 | Stop reason: HOSPADM

## 2023-04-20 RX ORDER — SODIUM CHLORIDE, SODIUM LACTATE, POTASSIUM CHLORIDE, CALCIUM CHLORIDE 600; 310; 30; 20 MG/100ML; MG/100ML; MG/100ML; MG/100ML
100 INJECTION, SOLUTION INTRAVENOUS CONTINUOUS
Status: DISCONTINUED | OUTPATIENT
Start: 2023-04-20 | End: 2023-04-20 | Stop reason: HOSPADM

## 2023-04-20 RX ORDER — FAMOTIDINE 10 MG/ML
20 INJECTION, SOLUTION INTRAVENOUS ONCE
Status: COMPLETED | OUTPATIENT
Start: 2023-04-20 | End: 2023-04-20

## 2023-04-20 RX ORDER — SODIUM CHLORIDE 0.9 % (FLUSH) 0.9 %
3 SYRINGE (ML) INJECTION EVERY 12 HOURS SCHEDULED
Status: DISCONTINUED | OUTPATIENT
Start: 2023-04-20 | End: 2023-04-20 | Stop reason: HOSPADM

## 2023-04-20 RX ORDER — SODIUM CHLORIDE, SODIUM LACTATE, POTASSIUM CHLORIDE, CALCIUM CHLORIDE 600; 310; 30; 20 MG/100ML; MG/100ML; MG/100ML; MG/100ML
9 INJECTION, SOLUTION INTRAVENOUS CONTINUOUS
Status: DISCONTINUED | OUTPATIENT
Start: 2023-04-20 | End: 2023-04-20 | Stop reason: HOSPADM

## 2023-04-20 RX ORDER — LABETALOL HYDROCHLORIDE 5 MG/ML
5 INJECTION, SOLUTION INTRAVENOUS
Status: DISCONTINUED | OUTPATIENT
Start: 2023-04-20 | End: 2023-04-20 | Stop reason: HOSPADM

## 2023-04-20 RX ORDER — FENTANYL CITRATE 50 UG/ML
50 INJECTION, SOLUTION INTRAMUSCULAR; INTRAVENOUS
Status: DISCONTINUED | OUTPATIENT
Start: 2023-04-20 | End: 2023-04-20 | Stop reason: HOSPADM

## 2023-04-20 RX ORDER — ONDANSETRON 2 MG/ML
4 INJECTION INTRAMUSCULAR; INTRAVENOUS ONCE AS NEEDED
Status: DISCONTINUED | OUTPATIENT
Start: 2023-04-20 | End: 2023-04-20 | Stop reason: HOSPADM

## 2023-04-20 RX ORDER — DEXAMETHASONE SODIUM PHOSPHATE 4 MG/ML
INJECTION, SOLUTION INTRA-ARTICULAR; INTRALESIONAL; INTRAMUSCULAR; INTRAVENOUS; SOFT TISSUE AS NEEDED
Status: DISCONTINUED | OUTPATIENT
Start: 2023-04-20 | End: 2023-04-20 | Stop reason: SURG

## 2023-04-20 RX ORDER — MIDAZOLAM HYDROCHLORIDE 1 MG/ML
1 INJECTION INTRAMUSCULAR; INTRAVENOUS
Status: DISCONTINUED | OUTPATIENT
Start: 2023-04-20 | End: 2023-04-20 | Stop reason: HOSPADM

## 2023-04-20 RX ORDER — OXYCODONE AND ACETAMINOPHEN 7.5; 325 MG/1; MG/1
1 TABLET ORAL EVERY 4 HOURS PRN
Status: DISCONTINUED | OUTPATIENT
Start: 2023-04-20 | End: 2023-04-20 | Stop reason: HOSPADM

## 2023-04-20 RX ORDER — ONDANSETRON 4 MG/1
4 TABLET, FILM COATED ORAL EVERY 8 HOURS PRN
Qty: 12 TABLET | Refills: 0 | Status: SHIPPED | OUTPATIENT
Start: 2023-04-20

## 2023-04-20 RX ORDER — PROPOFOL 10 MG/ML
VIAL (ML) INTRAVENOUS AS NEEDED
Status: DISCONTINUED | OUTPATIENT
Start: 2023-04-20 | End: 2023-04-20 | Stop reason: SURG

## 2023-04-20 RX ORDER — EPHEDRINE SULFATE 50 MG/ML
5 INJECTION, SOLUTION INTRAVENOUS ONCE AS NEEDED
Status: DISCONTINUED | OUTPATIENT
Start: 2023-04-20 | End: 2023-04-20 | Stop reason: HOSPADM

## 2023-04-20 RX ORDER — NALOXONE HCL 0.4 MG/ML
0.2 VIAL (ML) INJECTION AS NEEDED
Status: DISCONTINUED | OUTPATIENT
Start: 2023-04-20 | End: 2023-04-20 | Stop reason: HOSPADM

## 2023-04-20 RX ORDER — TRAMADOL HYDROCHLORIDE 50 MG/1
50 TABLET ORAL EVERY 6 HOURS PRN
Qty: 36 TABLET | Refills: 0 | Status: SHIPPED | OUTPATIENT
Start: 2023-04-20

## 2023-04-20 RX ORDER — HYDROCODONE BITARTRATE AND ACETAMINOPHEN 7.5; 325 MG/1; MG/1
1 TABLET ORAL ONCE AS NEEDED
Status: DISCONTINUED | OUTPATIENT
Start: 2023-04-20 | End: 2023-04-20 | Stop reason: HOSPADM

## 2023-04-20 RX ORDER — FLUMAZENIL 0.1 MG/ML
0.2 INJECTION INTRAVENOUS AS NEEDED
Status: DISCONTINUED | OUTPATIENT
Start: 2023-04-20 | End: 2023-04-20 | Stop reason: HOSPADM

## 2023-04-20 RX ORDER — PROMETHAZINE HYDROCHLORIDE 25 MG/1
25 TABLET ORAL ONCE AS NEEDED
Status: DISCONTINUED | OUTPATIENT
Start: 2023-04-20 | End: 2023-04-20 | Stop reason: HOSPADM

## 2023-04-20 RX ORDER — LIDOCAINE HYDROCHLORIDE 20 MG/ML
INJECTION, SOLUTION EPIDURAL; INFILTRATION; INTRACAUDAL; PERINEURAL AS NEEDED
Status: DISCONTINUED | OUTPATIENT
Start: 2023-04-20 | End: 2023-04-20 | Stop reason: SURG

## 2023-04-20 RX ORDER — HYDROCODONE BITARTRATE AND ACETAMINOPHEN 5; 325 MG/1; MG/1
1 TABLET ORAL ONCE AS NEEDED
Status: DISCONTINUED | OUTPATIENT
Start: 2023-04-20 | End: 2023-04-20 | Stop reason: HOSPADM

## 2023-04-20 RX ORDER — PROMETHAZINE HYDROCHLORIDE 25 MG/1
25 SUPPOSITORY RECTAL ONCE AS NEEDED
Status: DISCONTINUED | OUTPATIENT
Start: 2023-04-20 | End: 2023-04-20 | Stop reason: HOSPADM

## 2023-04-20 RX ORDER — CEFAZOLIN SODIUM 2 G/100ML
2 INJECTION, SOLUTION INTRAVENOUS ONCE
Status: COMPLETED | OUTPATIENT
Start: 2023-04-20 | End: 2023-04-20

## 2023-04-20 RX ORDER — FENTANYL CITRATE 50 UG/ML
INJECTION, SOLUTION INTRAMUSCULAR; INTRAVENOUS AS NEEDED
Status: DISCONTINUED | OUTPATIENT
Start: 2023-04-20 | End: 2023-04-20 | Stop reason: SURG

## 2023-04-20 RX ORDER — LIDOCAINE HYDROCHLORIDE 10 MG/ML
0.5 INJECTION, SOLUTION EPIDURAL; INFILTRATION; INTRACAUDAL; PERINEURAL ONCE AS NEEDED
Status: DISCONTINUED | OUTPATIENT
Start: 2023-04-20 | End: 2023-04-20 | Stop reason: HOSPADM

## 2023-04-20 RX ORDER — MAGNESIUM HYDROXIDE 1200 MG/15ML
LIQUID ORAL AS NEEDED
Status: DISCONTINUED | OUTPATIENT
Start: 2023-04-20 | End: 2023-04-20 | Stop reason: HOSPADM

## 2023-04-20 RX ORDER — IBUPROFEN 600 MG/1
600 TABLET ORAL EVERY 6 HOURS PRN
Qty: 60 TABLET | Refills: 0 | Status: SHIPPED | OUTPATIENT
Start: 2023-04-20

## 2023-04-20 RX ORDER — IPRATROPIUM BROMIDE AND ALBUTEROL SULFATE 2.5; .5 MG/3ML; MG/3ML
3 SOLUTION RESPIRATORY (INHALATION) ONCE AS NEEDED
Status: DISCONTINUED | OUTPATIENT
Start: 2023-04-20 | End: 2023-04-20 | Stop reason: HOSPADM

## 2023-04-20 RX ORDER — HYDRALAZINE HYDROCHLORIDE 20 MG/ML
5 INJECTION INTRAMUSCULAR; INTRAVENOUS
Status: DISCONTINUED | OUTPATIENT
Start: 2023-04-20 | End: 2023-04-20 | Stop reason: HOSPADM

## 2023-04-20 RX ORDER — HYDROMORPHONE HYDROCHLORIDE 1 MG/ML
0.5 INJECTION, SOLUTION INTRAMUSCULAR; INTRAVENOUS; SUBCUTANEOUS
Status: DISCONTINUED | OUTPATIENT
Start: 2023-04-20 | End: 2023-04-20 | Stop reason: HOSPADM

## 2023-04-20 RX ADMIN — LIDOCAINE HYDROCHLORIDE 100 MG: 20 INJECTION, SOLUTION EPIDURAL; INFILTRATION; INTRACAUDAL; PERINEURAL at 10:26

## 2023-04-20 RX ADMIN — CEFAZOLIN SODIUM 2 G: 2 INJECTION, SOLUTION INTRAVENOUS at 10:10

## 2023-04-20 RX ADMIN — FENTANYL CITRATE 100 MCG: 50 INJECTION, SOLUTION INTRAMUSCULAR; INTRAVENOUS at 10:26

## 2023-04-20 RX ADMIN — PROPOFOL 200 MG: 10 INJECTION, EMULSION INTRAVENOUS at 10:26

## 2023-04-20 RX ADMIN — SODIUM CHLORIDE, POTASSIUM CHLORIDE, SODIUM LACTATE AND CALCIUM CHLORIDE 9 ML/HR: 600; 310; 30; 20 INJECTION, SOLUTION INTRAVENOUS at 09:57

## 2023-04-20 RX ADMIN — FAMOTIDINE 20 MG: 10 INJECTION INTRAVENOUS at 10:01

## 2023-04-20 RX ADMIN — DEXAMETHASONE SODIUM PHOSPHATE 6 MG: 4 INJECTION, SOLUTION INTRAMUSCULAR; INTRAVENOUS at 10:31

## 2023-04-20 NOTE — OP NOTE
OPERATIVE REPORT    Patient Name:  Danya Frost  YOB: 1980  MRN: [id number]    Date of Surgery:  4/20/2023    Pre-op Diagnosis:   Left cubital tunnel syndrome       Post-Op Diagnosis:  Same    Procedure:  Left cubital tunnel release without ulnar nerve transposition    Staff:  Surgeon(s):  Raza Bowman MD     Anesthesia: General    Estimated Blood Loss: 10 mL    Implants:    Nothing was implanted during the procedure    Specimen:          None      Findings: As expected, after release the ulnar nerve was not subluxating    Complications: None     Indications:      Patient is a 42-year-old woman with a history of left elbow contracture and recent development of left hand and upper extremity paresthesias.  She has seen multiple providers in this regard and was subsequently seen by me as an outpatient.  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  Given these findings, the patient elected to proceed with cubital tunnel release, possible ulnar nerve transposition.  Risks of the procedure were discussed these included but not limited to bleeding, infection, damage to adjacent structures, injury to the ulnar nerve, a subluxating ulnar nerve if not transposed, persistent atrophy of the hand, surgery does not accomplish its intended result, need for additional procedures, anesthesia complications.  She understood the risks and elected to proceed.     Description of Procedure:    Patient was seen and evaluated in preoperative holding area found to be neurovascular intact her left upper extremity was marked by me consent was verified.  She is brought to the OR #9 at the [facility] outpatient surgery center.  Satisfactory anesthesia was induced.  Her left upper extremity was preprepped with alcohol and subsequently prepped and draped normal sterile fashion ChloraPrep.  Timeout was performed on agreement  with the patient identity, laterality, procedure to be performed.  She received Ancef prior to incision.    Esmarch was utilized to exsanguinate the left upper extremity and tourniquet was inflated at the level of the arm for a total of 19 minutes at 250 mmHg.  Skin incision was made starting approximately 7 cm proximal to the medial epicondyle incision was carried distally into the proximal forearm approximating the path of the ulnar nerve.  Careful dissection was carried deep and the ulnar nerve was identified at the medial aspect of the triceps.  The fascia overlying the triceps was incised and careful dissection was carried around the superficial aspect of the ulnar nerve.  Metzenbaum scissors were utilized to to carry dissection distal to open up Do ligaments.  Continued dissection was carried to the head of the flexor carpi ulnaris muscle.  The overlying fascia was incised in addition to the deep fascia freeing the nerve.  Superficial fasica of the FDS was also released.  Palpation along the nerve distally did not demonstrate any further areas of entrapment.  Dissection was then focused more proximally.  Metzenbaum scissors were utilized to continued dissection proximally to the arcade of [place].  After this release was performed the nerve was freed from the medial epicondyles and the elbow was taken through range of motion.  There is found to be no subluxation of the ulnar nerve therefore I elected not to transpose the nerve.  The medial intermuscular septum was excised.    The wound bed was irrigated with normal saline solution and closed in a layered manner with 2 oh and the deep dermal layers and 3-0 Monocryl in a running subcuticular manner.  The skin was injected with half percent Marcaine.  Incision sealed with Exofin and dressed with Aquacel and Ace wrap.    At the end of the procedure all sponge needle counts were correct.  I was present conducted the entire to the procedure.      Raza CONNER  MD [place]     Date: 4/20/2023  Time: 11:42 EDT

## 2023-04-20 NOTE — ANESTHESIA PROCEDURE NOTES
Airway  Urgency: elective    Date/Time: 4/20/2023 10:28 AM  Airway not difficult    General Information and Staff    Patient location during procedure: OR  Anesthesiologist: Zac Alvarado DO  CRNA/CAA: Deonna Garcia CRNA    Indications and Patient Condition  Indications for airway management: airway protection    Preoxygenated: yes  Mask difficulty assessment: 1 - vent by mask    Final Airway Details  Final airway type: supraglottic airway      Successful airway: classic  Size 4     Number of attempts at approach: 1  Assessment: lips, teeth, and gum same as pre-op    Additional Comments  PreO2, IV induction, easy mask, LMA placed w/o difficulty, cuff as packaged- no additional air placed, secured in placed, EBBSH, +etCO2, atraumatic, teeth and lips as preop.

## 2023-04-20 NOTE — ANESTHESIA PREPROCEDURE EVALUATION
Anesthesia Evaluation     Patient summary reviewed   history of anesthetic complications: difficult airway  NPO Solid Status: > 8 hours  NPO Liquid Status: > 2 hours           Airway   Mallampati: III  TM distance: >3 FB  Small opening and Possible difficult intubation  Comment: Last GA done with SGA w/o issues  Dental - normal exam     Pulmonary     breath sounds clear to auscultation  Cardiovascular     Rhythm: regular  Rate: normal        Neuro/Psych  GI/Hepatic/Renal/Endo      Musculoskeletal     Abdominal    Substance History      OB/GYN          Other        ROS/Med Hx Other: Lymphangioma.      Phys Exam Other: Patient has significantly limited mouth opening secondary to mandibular problems and surgery.  Larynx possibly deviated to the left.                   Anesthesia Plan    ASA 2     general     intravenous induction     Anesthetic plan, risks, benefits, and alternatives have been provided, discussed and informed consent has been obtained with: patient.    Plan discussed with CRNA.

## 2023-04-20 NOTE — H&P
Orthopaedic/Musculoskeletal Oncologic  H&P      Patient Identification:  Name: Danya Frost  Age: 42 y.o.  Sex: female  :  1980  MRN: [id number]                Chief Complaint:  LUE paresthesias    History of Present Illness:   Danya Frost is a 42 y.o. female who presents for operative intervention regarding the above and diagnosis of cubital tunnel syndrome    Past Medical History:  Past Medical History:   Diagnosis Date   • Anesthesia complication     PT STATES HAD DIFFICULTY WITH NASAL INTUBATION   • Cubital tunnel syndrome     LEFT:NUMBNESS IN LEFT OUTER FOREARM, IN BETWEEN 4TH AND FINGER, AS WELL AS PART OF TOP OF HAND.  WEAKNESS AND PAIN   • History of MRSA infection 2002    RIGHT BREAST AFTER BREAST IMPLANTS. TREATED AT [facility]   • History of sepsis     SEVERAL YEARS AGO   • Hypothyroid    • Lymphedema     RIGHT HAND/RIGHT FOREARM:  SINCE CHILDHOOD   • Psoriasis     BACK OF NECK       Past Surgical History:  Past Surgical History:   Procedure Laterality Date   • BREAST AUGMENTATION Bilateral    • BREAST BIOPSY Right    • BREAST IMPLANT REMOVAL Bilateral    • CERVICAL CONIZATION N/A 2018    Procedure: CERVICAL CONIZATION;  Surgeon: Tamika Escalona MD;  Location: [facility];  Service: Obstetrics/Gynecology   • D & C HYSTEROSCOPY N/A 2018    Procedure: DILATATION AND CURETTAGE HYSTEROSCOPY WITH Myosure;  Surgeon: Tamika Escalona MD;  Location: [facility];  Service: Obstetrics/Gynecology   • FINGER SURGERY Right     2ND AND 3RD FINGERS:  REMOVED GROWTH PLATE   • MANDIBLE SURGERY Bilateral     AS A TEENAGER   • OTHER SURGICAL HISTORY Right     MULTIPLE SURGERIES TO REMOVE ANGIOMAS:  RIGHT ARM.  ONGOING LYMPHDEMA IN RIGHT ARM ONLY AND RESTRICTED EXTREMITY   • TONGUE SURGERY      LASER AS A SMALL CHILD   • TONSILLECTOMY     • TUBAL ABDOMINAL LIGATION         Home Meds:  Medications Prior to  Admission   Medication Sig Dispense Refill Last Dose   • SYNTHROID 175 MCG tablet Take 1 tablet by mouth Daily.   2023 at 0630   • levonorgestrel (MIRENA) 20 MCG/24HR IUD To be inserted one time by prescriber. 1 each 0 Unknown       Current Meds:   Scheduled Meds:ceFAZolin, 2 g, Intravenous, Once  sodium chloride, 3 mL, Intravenous, Q12H      Continuous Infusions:lactated ringers, 9 mL/hr, Last Rate: 9 mL/hr (23 0957)      PRN Meds:.•  fentanyl  •  lidocaine PF 1%  •  midazolam  •  sodium chloride    Allergies:  Allergies   Allergen Reactions   • Latex Irritability     Other reaction(s): Irritability       Social History:   Social History     Occupational History   • Occupation: Manager at [employer]   Tobacco Use   • Smoking status: Never   • Smokeless tobacco: Never   Vaping Use   • Vaping Use: Never used   Substance and Sexual Activity   • Alcohol use: Yes     Comment: MONTHLY   • Drug use: No   • Sexual activity: Yes     Partners: Male     Birth control/protection: Surgical      Social History     Social History Narrative   • Not on file       Family History:  Family History   Problem Relation Age of Onset   • Breast cancer Neg Hx    • Ovarian cancer Neg Hx    • Uterine cancer Neg Hx    • Colon cancer Neg Hx    • Malig Hyperthermia Neg Hx         Review of Systems  Constitutional: negative  Neurological: positive for paresthesia      Objective:  tMax 24 hrs: Temp (24hrs), Av.6 °F (36.4 °C), Min:97.6 °F (36.4 °C), Max:97.6 °F (36.4 °C)    Vitals Ranges:   Temp:  [97.6 °F (36.4 °C)] 97.6 °F (36.4 °C)  Heart Rate:  [68] 68  Resp:  [18] 18  BP: (114)/(94) 114/94  Intake and Output Last 3 Shifts:   No intake/output data recorded.    Physical Exam:  /94 (BP Location: Left arm, Patient Position: Lying)   Pulse 68   Temp 97.6 °F (36.4 °C) (Oral)   Resp 18   SpO2 100%     General Appearance:    Alert, cooperative, no distress, appears stated age   HEENT:    Normocephalic, atraumatic. Sclerae anicteric.   Mucous         membranes moist.   Cardio:   Regular rate and rhythm.   Lungs:     Breathing unlabored on room air       Focused MSK:   LUE atrophy of the intrinsics           Data Review:  Admission on 2023   Component Date Value Ref Range Status   • HCG, Urine, QL 2023 Negative  Negative Final   • Lot Number 2023 [id number]   Final   • Internal Positive Control 2023 Positive  Positive, Passed Final   • Internal Negative Control 2023 Negative  Negative, Passed Final   • Expiration Date 2023   Final       No results found.      Assessment:    Patient Active Problem List   Diagnosis   • Abnormal pap   • Cervical intraepithelial neoplasia grade 2   • Abnormal findings on diagnostic imaging of breast   • Lymphangioma   • Hypothyroidism   • Streptococcal sepsis     Danya Frost is a 42 y.o. female who presents with left cubital tunnel syndrome.      Medical decision-makin.  Patient marked  2. R/B/A discussed  3. Consent verified  4. To OR    Raza Bowman MD  2023

## 2023-04-20 NOTE — ANESTHESIA POSTPROCEDURE EVALUATION
Patient: Danya Frost    Procedure Summary     Date: 04/20/23 Room / Location:  [facility] /  [facility]    Anesthesia Start: 1014 Anesthesia Stop: 1140    Procedure: CUBITAL TUNNEL RELEASE (Left) Diagnosis:     Surgeons: Raza Bowman MD Provider: Zac Alvarado DO    Anesthesia Type: general ASA Status: 2          Anesthesia Type: general    Vitals  Vitals Value Taken Time   /70 04/20/23 1300   Temp 36.9 °C (98.4 °F) 04/20/23 1300   Pulse 82 04/20/23 1304   Resp 18 04/20/23 1300   SpO2 100 % 04/20/23 1304   Vitals shown include unvalidated device data.        Post Anesthesia Care and Evaluation    Patient location during evaluation: bedside  Patient participation: complete - patient participated  Level of consciousness: awake and alert  Pain management: adequate    Airway patency: patent  Anesthetic complications: No anesthetic complications  PONV Status: controlled  Cardiovascular status: acceptable and hemodynamically stable  Respiratory status: acceptable, spontaneous ventilation and nonlabored ventilation  Hydration status: acceptable    Comments: /76 (BP Location: Right arm)   Pulse 73   Temp 36.9 °C (98.4 °F) (Oral)   Resp 15   SpO2 100%

## 2023-11-29 ENCOUNTER — TELEPHONE (OUTPATIENT)
Dept: OBSTETRICS AND GYNECOLOGY | Facility: CLINIC | Age: 43
End: 2023-11-29
Payer: COMMERCIAL

## 2023-11-29 DIAGNOSIS — R31.9 HEMATURIA, UNSPECIFIED TYPE: Primary | ICD-10-CM

## 2023-11-30 ENCOUNTER — CLINICAL SUPPORT (OUTPATIENT)
Dept: OBSTETRICS AND GYNECOLOGY | Facility: CLINIC | Age: 43
End: 2023-11-30
Payer: COMMERCIAL

## 2023-11-30 ENCOUNTER — TELEPHONE (OUTPATIENT)
Dept: OBSTETRICS AND GYNECOLOGY | Facility: CLINIC | Age: 43
End: 2023-11-30
Payer: COMMERCIAL

## 2023-11-30 DIAGNOSIS — R35.0 URINARY FREQUENCY: Primary | ICD-10-CM

## 2023-11-30 LAB
BILIRUB BLD-MCNC: NEGATIVE MG/DL
CLARITY, POC: CLEAR
COLOR UR: YELLOW
GLUCOSE UR STRIP-MCNC: NEGATIVE MG/DL
KETONES UR QL: NEGATIVE
LEUKOCYTE EST, POC: NEGATIVE
NITRITE UR-MCNC: POSITIVE MG/ML
PH UR: 5.5 [PH] (ref 5–8)
PROT UR STRIP-MCNC: NEGATIVE MG/DL
RBC # UR STRIP: NEGATIVE /UL
SP GR UR: 1.02 (ref 1–1.03)
UROBILINOGEN UR QL: ABNORMAL

## 2023-11-30 PROCEDURE — 81002 URINALYSIS NONAUTO W/O SCOPE: CPT | Performed by: OBSTETRICS & GYNECOLOGY

## 2023-11-30 RX ORDER — NITROFURANTOIN 25; 75 MG/1; MG/1
100 CAPSULE ORAL 2 TIMES DAILY
Qty: 14 CAPSULE | Refills: 0 | Status: SHIPPED | OUTPATIENT
Start: 2023-11-30 | End: 2023-12-07

## 2023-11-30 NOTE — TELEPHONE ENCOUNTER
UA with + nitrites. Rx sent for UTI.  Culture pending. Please let patient know Rx has been sent. Thanks!

## 2023-12-01 NOTE — TELEPHONE ENCOUNTER
Left message that rx for UTI has been sent to the pharmacy. We will wait for culture and let her know if it needs to be changed.

## 2023-12-03 LAB
BACTERIA UR CULT: ABNORMAL
BACTERIA UR CULT: ABNORMAL
OTHER ANTIBIOTIC SUSC ISLT: ABNORMAL

## 2023-12-05 ENCOUNTER — TELEPHONE (OUTPATIENT)
Dept: OBSTETRICS AND GYNECOLOGY | Facility: CLINIC | Age: 43
End: 2023-12-05
Payer: COMMERCIAL

## 2023-12-05 RX ORDER — CIPROFLOXACIN 250 MG/1
250 TABLET, FILM COATED ORAL 2 TIMES DAILY
Qty: 10 TABLET | Refills: 0 | Status: SHIPPED | OUTPATIENT
Start: 2023-12-05 | End: 2023-12-10

## 2023-12-05 NOTE — TELEPHONE ENCOUNTER
Called patient and reviewed that UTI is P.aeuruginosa.  She has taken cipro before without issues. Rx sent.

## 2024-10-25 ENCOUNTER — OFFICE VISIT (OUTPATIENT)
Dept: OBSTETRICS AND GYNECOLOGY | Facility: CLINIC | Age: 44
End: 2024-10-25
Payer: COMMERCIAL

## 2024-10-25 VITALS
WEIGHT: 140 LBS | DIASTOLIC BLOOD PRESSURE: 74 MMHG | BODY MASS INDEX: 23.9 KG/M2 | HEIGHT: 64 IN | HEART RATE: 84 BPM | SYSTOLIC BLOOD PRESSURE: 111 MMHG

## 2024-10-25 DIAGNOSIS — N76.0 ACUTE VAGINITIS: ICD-10-CM

## 2024-10-25 DIAGNOSIS — Q89.8 HEMIHYPERTROPHY: ICD-10-CM

## 2024-10-25 DIAGNOSIS — Z01.411 ENCOUNTER FOR GYNECOLOGICAL EXAMINATION WITH ABNORMAL FINDING: Primary | ICD-10-CM

## 2024-10-25 RX ORDER — PREGABALIN 75 MG/1
75 CAPSULE ORAL 3 TIMES DAILY
COMMUNITY
Start: 2024-06-10

## 2024-10-25 RX ORDER — HYDROCODONE BITARTRATE AND ACETAMINOPHEN 10; 325 MG/1; MG/1
1 TABLET ORAL EVERY 6 HOURS PRN
COMMUNITY
Start: 2024-08-29 | End: 2024-11-24

## 2024-10-25 NOTE — PROGRESS NOTES
Subjective   Danya Frost is a 43 y.o. female   Chief Complaint   Patient presents with    Well woman exam with routine gynecological exam      History of Present Illness  Danya Frost is a 43 y.o.  who presents for an annual examination.   C/o coming and going discharge and odor  Doing well with Mirena  Had multiple surgeries including C spine fusion, bilateral nerve releases in upper extremities    Pap history:    2021 NIL, HPV pos (16,18/45 negative)  Dec 2021 ECC normal    2021 ASCUS, HPV positive                          2018 Conization: ODESSA 2 at 6-9 o'clock extending to margins (EMB at that time normal)                                  May 2018 ODESSA 2 at 1 o'clock                          May 2018 LSIL, HPV positive                          2016 ECC normal                          2016 LSIL, HPV positive     Prior abnormal paps: yes, denies h/o LEEP; has had colposcopy 3-4 times    STDs  Sexually active: yes  History of STDs: HPV  Contraception:  S/p BTL, using Mirena IUD since Dec 2021  Mammogram:   - saw Dr. Baez who thought there may be a hemihypertrophy of both bone and soft tissue of the extremity  - she last saw Dr. Mahmood in  and is interested in going back  Colon cancer screening: not in the past    History of physical abuse: no, History of sexual abuse: no and History of verbal/emotional abuse: no    Screening for BRCA-   Is patient's family history significant for any of the following?   no  For non-Ashkenazi Bahai women:   Two first-degree relatives with breast cancer, one of whom was diagnosed at age 50 or younger   A combination of three or more first or second-degree relatives with breast cancer regardless of age at diagnosis   A combination of both breast and ovarian cancer among first and second-degree relatives   A first-degree relative with bilateral breast cancer   A combination of two or more first or second degree relatives with ovarian cancer,  regardless of age at diagnosis   A first or second-degree relative with both breast and ovarian cancer at any age   History of breast cancer in a male relative   For women of Ashkenazi Congregational descent:   Any first-degree relative (or two second degree relatives on the same side of the family) with breast or ovarian cancer   Recommendations from the [place] Preventive Services Task Force on who should be offered genetic testing for BRCA mutations*     Past Medical History:   Diagnosis Date    Anesthesia complication     PT STATES HAD DIFFICULTY WITH NASAL INTUBATION    Cubital tunnel syndrome     LEFT:NUMBNESS IN LEFT OUTER FOREARM, IN BETWEEN 4TH AND FINGER, AS WELL AS PART OF TOP OF HAND.  WEAKNESS AND PAIN    History of MRSA infection 2002    RIGHT BREAST AFTER BREAST IMPLANTS. TREATED AT [facility]    History of sepsis     SEVERAL YEARS AGO    Hypothyroid     Lymphedema     RIGHT HAND/RIGHT FOREARM:  SINCE CHILDHOOD    Psoriasis     BACK OF NECK     Past Surgical History:   Procedure Laterality Date    BREAST AUGMENTATION Bilateral     BREAST BIOPSY Right     BREAST IMPLANT REMOVAL Bilateral     CERVICAL CONIZATION N/A 11/07/2018    Procedure: CERVICAL CONIZATION;  Surgeon: Tamika Escalona MD;  Location: [facility] OR;  Service: Obstetrics/Gynecology    CUBITAL TUNNEL RELEASE Left 4/20/2023    Procedure: CUBITAL TUNNEL RELEASE;  Surgeon: Raza Bowman MD;  Location: [facility];  Service: Orthopedics;  Laterality: Left;    D & C HYSTEROSCOPY N/A 11/07/2018    Procedure: DILATATION AND CURETTAGE HYSTEROSCOPY WITH Myosure;  Surgeon: Tamika Escalona MD;  Location: [facility] OR;  Service: Obstetrics/Gynecology    FINGER SURGERY Right     2ND AND 3RD FINGERS:  REMOVED GROWTH PLATE    MANDIBLE SURGERY Bilateral     AS A TEENAGER    OTHER SURGICAL HISTORY Right     MULTIPLE SURGERIES TO REMOVE ANGIOMAS:  RIGHT ARM.  ONGOING LYMPHDEMA IN RIGHT ARM ONLY AND RESTRICTED EXTREMITY    TONGUE SURGERY   "    LASER AS A SMALL CHILD    TONSILLECTOMY      TUBAL ABDOMINAL LIGATION       Social History     Tobacco Use    Smoking status: Never    Smokeless tobacco: Never   Vaping Use    Vaping status: Never Used   Substance Use Topics    Alcohol use: Yes     Comment: MONTHLY    Drug use: No     Family History   Problem Relation Age of Onset    Breast cancer Neg Hx     Ovarian cancer Neg Hx     Uterine cancer Neg Hx     Colon cancer Neg Hx     Malig Hyperthermia Neg Hx      Current Outpatient Medications on File Prior to Visit   Medication Sig Dispense Refill    HYDROcodone-acetaminophen (NORCO)  MG per tablet Take 1 tablet by mouth Every 6 (Six) Hours As Needed.      pregabalin (LYRICA) 75 MG capsule Take 1 capsule by mouth 3 (Three) Times a Day.      ibuprofen (ADVIL,MOTRIN) 600 MG tablet Take 1 tablet by mouth Every 6 (Six) Hours As Needed for Mild Pain. (Patient not taking: Reported on 7/18/2023) 60 tablet 0    levonorgestrel (MIRENA) 20 MCG/24HR IUD To be inserted one time by prescriber. 1 each 0    ondansetron (Zofran) 4 MG tablet Take 1 tablet by mouth Every 8 (Eight) Hours As Needed for Nausea or Vomiting. (Patient not taking: Reported on 7/18/2023) 12 tablet 0    SYNTHROID 175 MCG tablet Take 1 tablet by mouth Daily.      traMADol (ULTRAM) 50 MG tablet Take 1 tablet by mouth Every 6 (Six) Hours As Needed for Moderate Pain. (Patient not taking: Reported on 7/18/2023) 36 tablet 0     No current facility-administered medications on file prior to visit.     Allergies   Allergen Reactions    Latex Irritability     Other reaction(s): Irritability            Review of Systems   See above      Objective    /74   Pulse 84   Ht 162.6 cm (64.02\")   Wt 63.5 kg (140 lb)   LMP  (LMP Unknown)   BMI 24.02 kg/m²    Physical Exam   Constitutional: She is oriented to person, place, and time. She appears well-developed and well-nourished. No distress.   HENT:   Head: Normocephalic and atraumatic.   Neck: No tracheal " deviation present. No thyromegaly present.   Cardiovascular: Normal rate, regular rhythm and normal heart sounds.  Exam reveals no gallop and no friction rub.    No murmur heard.  Pulmonary/Chest: left breast smaller than right with some scarring from prior surgeries  Abdominal: Soft. She exhibits no distension and no mass. There is no tenderness.   Genitourinary: Uterus normal. No labial fusion. There is no rash, lesion or injury on the right labia. There is no rash, lesion or injury on the left labia. Uterus is not deviated, not enlarged, not fixed and not tender. Cervix exhibits no motion tenderness, no discharge and no friability. Right adnexum displays no mass, no tenderness and no fullness. Left adnexum displays no mass, no tenderness and no fullness. No tenderness or bleeding in the vagina. No vaginal discharge found. IUD strings seen  Musculoskeletal: Normal range of motion. She exhibits no edema or deformity.   Lymphadenopathy:     She has no cervical adenopathy.   Neurological: She is alert and oriented to person, place, and time.   Skin: Skin is warm and dry. No rash noted. She is not diaphoretic.   Psychiatric: She has a normal mood and affect. Her behavior is normal. Judgment and thought content normal.         Assessment & Plan   Problems Addressed this Visit    None  Visit Diagnoses       Encounter for gynecological examination with abnormal finding    -  Primary    Relevant Orders    IGP, Apt HPV,rfx 16 / 18,45          Diagnoses         Codes Comments    Encounter for gynecological examination with abnormal finding    -  Primary ICD-10-CM: Z01.411  ICD-9-CM: V72.31           Well woman counseling/screening:    Cervical cancer screening:    Reports h/o cervical dysplasia - most recent NIL, HPV positive. Prior to that had Rosette 2 on [facility] in 2018   The patient is due for a pap today.    Screening guidelines discussed with patient  Breast cancer screening:    Clinical breast exam recommended for age 20-39  years every 1-3 years  She was recommended additional imaging, biopsy in the past but has complicated imaging due to prior surgeries/scarring.    Saw Dr. Mahmood in the past and would like to go back. Was recommended follow up MRI, US, Mammogram and will reach out to see if they want this prior to seeing her    Breast self awareness encouraged  STD Screening   Declines  Contraception :   S/p BTL and Mirena IUD for bleeding control   Colon cancer screening: advised on colonoscopy vs. Cologuard. Will consider  Family history    does not demonstrate need for genetics referral   Healthy lifestyle counseling:   Patient was counseled on    Body mass index is 24.02 kg/m².

## 2024-10-27 LAB
A VAGINAE DNA VAG QL NAA+PROBE: NORMAL SCORE
BVAB2 DNA VAG QL NAA+PROBE: NORMAL SCORE
C ALBICANS DNA VAG QL NAA+PROBE: NEGATIVE
C GLABRATA DNA VAG QL NAA+PROBE: NEGATIVE
MEGA1 DNA VAG QL NAA+PROBE: NORMAL SCORE

## 2024-10-28 ENCOUNTER — TELEPHONE (OUTPATIENT)
Dept: SURGERY | Facility: CLINIC | Age: 44
End: 2024-10-28
Payer: COMMERCIAL

## 2024-11-01 LAB
CYTOLOGIST CVX/VAG CYTO: NORMAL
CYTOLOGY CVX/VAG DOC CYTO: NORMAL
CYTOLOGY CVX/VAG DOC THIN PREP: NORMAL
DX ICD CODE: NORMAL
HPV I/H RISK 4 DNA CVX QL PROBE+SIG AMP: NEGATIVE
Lab: NORMAL
OTHER STN SPEC: NORMAL
PATHOLOGIST CVX/VAG CYTO: NORMAL
STAT OF ADQ CVX/VAG CYTO-IMP: NORMAL

## 2024-11-04 ENCOUNTER — TELEPHONE (OUTPATIENT)
Dept: SURGERY | Facility: CLINIC | Age: 44
End: 2024-11-04
Payer: COMMERCIAL

## 2024-11-04 DIAGNOSIS — R92.8 ABNORMAL FINDING ON BREAST IMAGING: Primary | ICD-10-CM

## 2024-11-04 NOTE — TELEPHONE ENCOUNTER
Left VM for patient to callback regarding referral. She can speak to [place] if I am not available.     I called to ask patient if she has had any recent breast imaging in the last 6 years since the last time seen Dr. Mahmood in 2019?    If so where and when? If not will she be willing to get imaging done prior to Dr. Mahmood seeing her. (Orders are in from Dr. Mahmood).

## 2024-11-05 ENCOUNTER — TELEPHONE (OUTPATIENT)
Dept: SURGERY | Facility: CLINIC | Age: 44
End: 2024-11-05
Payer: COMMERCIAL

## 2024-11-05 NOTE — TELEPHONE ENCOUNTER
Received dx imaging orders from Dr. Mahmood to schedule prior to her appointment    Called patient to review and also see if she has any recent imaging at a location out side of [facility] or [facility] and to see if there are date/ times I need to avoid scheduling.    Left VM for patient to call me back / SD

## 2024-11-07 ENCOUNTER — TELEPHONE (OUTPATIENT)
Dept: SURGERY | Facility: CLINIC | Age: 44
End: 2024-11-07
Payer: COMMERCIAL

## 2024-11-07 NOTE — TELEPHONE ENCOUNTER
Called patient     11/5 at 9:45 am  11/7 at 9:50 am    Left VM to review test ordered to be scheduled from Dr. Mahmood and an appointment to be seen after in our office.    SD

## 2024-11-07 NOTE — TELEPHONE ENCOUNTER
Called MsRochelle Margot to review imaging that has been scheduled on 12/5    Breast MRI and Dx Mammo/US  At [facility]   Mammo / US arrival time is 8:45 am (no lotion or powder or jewelry on the upper body)  MRI arrival time at 6:15 pm at the hospital    VM left for her to call me and confirm or request a change.    Patient will be scheduled for a consult once she calls back to confirm imaging test appointments

## 2024-12-05 ENCOUNTER — HOSPITAL ENCOUNTER (OUTPATIENT)
Dept: MRI IMAGING | Facility: HOSPITAL | Age: 44
Discharge: HOME | End: 2024-12-05
Payer: COMMERCIAL

## 2024-12-05 ENCOUNTER — HOSPITAL ENCOUNTER (OUTPATIENT)
Dept: MAMMOGRAPHY | Facility: HOSPITAL | Age: 44
Discharge: HOME | End: 2024-12-05
Payer: COMMERCIAL

## 2024-12-05 ENCOUNTER — HOSPITAL ENCOUNTER (OUTPATIENT)
Dept: ULTRASOUND IMAGING | Facility: HOSPITAL | Age: 44
Discharge: HOME | End: 2024-12-05
Payer: COMMERCIAL

## 2024-12-05 DIAGNOSIS — R92.8 ABNORMAL FINDING ON BREAST IMAGING: ICD-10-CM

## 2024-12-05 PROCEDURE — 25510000002 GADOBENATE DIMEGLUMINE 529 MG/ML SOLUTION: Performed by: SURGERY

## 2024-12-05 PROCEDURE — 76642 ULTRASOUND BREAST LIMITED: CPT

## 2024-12-05 PROCEDURE — A9577 INJ MULTIHANCE: HCPCS | Performed by: SURGERY

## 2024-12-05 PROCEDURE — 77049 MRI BREAST C-+ W/CAD BI: CPT

## 2024-12-05 PROCEDURE — 77066 DX MAMMO INCL CAD BI: CPT

## 2024-12-05 PROCEDURE — G0279 TOMOSYNTHESIS, MAMMO: HCPCS

## 2024-12-05 RX ADMIN — GADOBENATE DIMEGLUMINE 15 ML: 529 INJECTION, SOLUTION INTRAVENOUS at 20:11

## 2024-12-18 ENCOUNTER — OFFICE VISIT (OUTPATIENT)
Dept: SURGERY | Facility: CLINIC | Age: 44
End: 2024-12-18
Payer: COMMERCIAL

## 2024-12-18 VITALS
OXYGEN SATURATION: 99 % | BODY MASS INDEX: 24.34 KG/M2 | HEIGHT: 64 IN | WEIGHT: 142.6 LBS | SYSTOLIC BLOOD PRESSURE: 122 MMHG | RESPIRATION RATE: 17 BRPM | HEART RATE: 98 BPM | DIASTOLIC BLOOD PRESSURE: 78 MMHG

## 2024-12-18 DIAGNOSIS — R92.8 ABNORMAL FINDING ON BREAST IMAGING: Primary | ICD-10-CM

## 2024-12-18 RX ORDER — HYDROCODONE BITARTRATE AND ACETAMINOPHEN 10; 325 MG/1; MG/1
TABLET ORAL
COMMUNITY
Start: 2024-12-06

## 2024-12-18 RX ORDER — ZOLPIDEM TARTRATE 5 MG/1
5 TABLET ORAL
COMMUNITY
Start: 2024-12-12 | End: 2025-01-11

## 2024-12-18 NOTE — PROGRESS NOTES
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 Danya Frost is a 39 yo woman, seen at the request of Tamika Escalona MD, for abnormal breast imaging. 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 She has a very complicated past surgical history and underwent so many operations on her jaw, right neck, chest, and upper extremity that she has lost count. Most of the operations happened when she was a small child at [facility]. At one of the initial operations, which sounds like it was a large volume resection involving her neck, axilla, and upper right chest, it is believed that her right breast bud was partially removed, resulting in incomplete development.     The right half of her tongue is also involved. In the remote past she underwent laser surgery on her tongue for small \"blisters.\" Currently, it will intermittently start to tingle and get swollen. She is not sure exactly what provokes this, but when it happens it is usually relieved by ibuprofen. She also has chronic, stable reduced neck mobility. When she was 13 yo, she developed progressive reduced jaw mobility and subsequently underwent a large reconstructive procedure on her maxilla and mandible. The multiple surgeries on her right arm have all been for \"tumor or cyst\" removal. Her right index and middle finger are also involved and have undergone reconstructive procedures. She can partially flex her middle finger but cannot flex her index finger at all.     She used to be followed by plastic surgeons, Dr. John Hackett ([facility]) and Dr. Gio Zeng ([facility]), who have since retired. In 2002, she began the process of breast reconstruction with tissue expanders and " implants. This was complicated by multiple infections, which ultimately ended with an admission in 12/2003 for an MRSA infection that caused spontaneous rupture of the skin over the prosthesis. Bilateral implants were removed and never replaced, as it was felt that her body could not tolerate a foreign object due to the biologic and anatomic variation caused by the lymphangioma. She reports that she also had multiple admissions throughout her childhood for various infections requiring antibiotics.     In 12/2017, she injured her right chest while carrying heavy box at work (she is a manager at [employer]). She did not break the skin. However, she subsequently develop a large swollen area in her right axilla. She was eventually admitted to [place] with bacteremia and required inpatient followed by outpatient IV antibiotics. She had a right axillary ultrasound done during admission which showed hypoechoic structures with vascular flow, possibly related to the known lymphangioma, but no drainable fluid collection. The right axillary swelling and pain eventually resolved with antibiotics.     She reports that she had a mammogram in 2003 that showed an abnormal finding on the right which was ultimately determined to be related to the lymphangioma, however these records are no longer available. She denies any prior history of breast biopsies. She denies any family history of breast or ovarian cancer.     In early September 2018 she developed some left-sided breast pain. She denies any associated breast lumps, skin changes, or nipple discharge. The pain has since resolved. As part of the workup for the left breast pain she underwent bilateral breast imaging which found several incidental cystic lesions on the right (see imaging report details in breast history section below).    1/14/2019:  Since her last visit, she underwent right diagnostic MMG and US and bilateral breast MRI, see imaging report details in breast history section  below. She also was seen by Dr. Baez. She denies any new complaints. She is on a new diet and has lost about 7 pounds in the last month.      12/18/2024, Interval History:  I have not seen Ms. Frost in about 6 years. At that point we had decided to follow the abnormal appearing areas in her right breast with imaging surveillance. She also saw Dr. Baez and discussed possible reconstructive surgery to obtain better symmetry. At the time, he wondered if the lymphangioma diagnosis was completely accurate and thought she may have more of a hemihypertropy of both the bone and soft tissue of the upper extremity.  She decided to hold off on reconstruction at that time.  Today she explains that she has not been seen in 6 years because she could not afford the breast imaging and had other more pressing health concerns.  She has had 5 surgeries in the past year, including cervical fusion and multiple bilateral upper extremity surgeries.  Imaging done at an outside hospital in the past year again noted the abnormal appearance of her right breast, which prompted her to follow-up with me again.      Breast history/imaging (updated 12/18/2024):     9/20/18, Bilateral diagnostic MMG and Bilateral US ([facility]):  MMG: Heterogeneously dense.  1. There is no suspicious finding in the region of clinical concern. No masses, suspicious microcalcifications or architectural distortion are identified in the left breast.  2. There is a high density, oval mass measuring 14 mm with circumscribed margins seen in the posterior one-third region of the right breast at 1:00 located 7 cm from the nipple.  3. There is an irregular mass measuring 22 mm with circumscribed margins in the right axilla located 10 cm from the nipple.  US:  1. There is no evidence of any solid mass or abnormal cystic elements in the left breast.  2. Ultrasound is suggestive of an oval complicated cyst versus solid mass with partially defined margins measuring 14 x 10 x  11 mm. Internal echotexture is heterogeneous and hypoechoic relative to adipose tissue.  3. Ultrasound is suggestive of a cluster of cyst with well-defined, thin margins measuring 22 x 7 x 8 mm. Internal architecture is anechoic.  4. There is an oval elongated debris-containing cyst with well-defined, thin margins measuring 5.1 x 0.9 x 2.5 cm seen in the right breast at 6:00.  IMPRESSION:  1. Area in the left breast is benign-negative.  2. Complicated cyst versus solid mass in the posterior one-third region of the right breast at 1:00 located 7 cm from the nipple is suspicious. Ultrasound-guided cyst aspiration is recommended to be followed by ultrasound-guided biopsy it cyst aspiration is unsuccessful.  3. Cluster of cysts in the right axilla located 10 cm from the nipple is suspicious. Ultrasound-guided cyst aspiration is recommended.  4. Debris-containing cyst in the right breast at 6:00 is suspicious. Ultrasound guided cyst aspiration is recommended.  BIRADS 4, Suspicious.     12/26/18, Right diagnostic MMG, Right US, & Bilateral Breast MRI ([facility]):  MMG:  Marker indicates a right breast skin scar in the upper-outer quadrant. Deep to the marker, there are linear areas of scarring similar to the 09/20/2018 examination. No suspicious calcifications within the right breast. Tomosynthesis demonstrates a circumscribed 15 mm nodule at the approximate 1:00 position. Along the right axilla there are several separate clustered circumscribed nodules or multiloculated nodular structure. It is similar in appearance to the previous mammogram as well.  No new breast mass.  US:   At the 1:00 position, 7 cm from the nipple there is a 1.4 x 1.0 x 1.1 cm complex cyst devoid of internal flow.  At the 6:00 position, 5 cm from the nipple, there is again demonstrated a spindle-shaped complex fluid collection similar to the previous  examination measuring 5.1 x 0.8 x 2.8 cm.  Located in the right axilla there is a collection of  cysts, both simple and complex which are individually all subcentimeter in size. Together they measure 2.2 x 0.9 x 0.7 cm, similar to the previous examination.  MRI:  There is asymmetry in breast size, right smaller than left in keeping with prior right breast surgery. In the posterior 1/3 of the  right breast at the 12 o'clock position there is a T1 hypointense weakly enhancing mass that measures 1.8 cm in the anterior posterior dimension, 1.2 cm in the medial lateral dimension, and 1.5 cm in the superior inferior dimension. A metallic clip is not seen in association with the mass.  In the axillary tail region of the right breast there is a T2 hyperintense lobulated mass that measures on the order of 1.6 cm in the  medial lateral dimension, 2.4 cm in the superior inferior dimension, and 1.3 cm in the anterior posterior dimension. Linear areas of increased T2 signal are seen in the subcutaneous region of the right axillary tail and right axilla. These likely represent areas of lymphangioma.  In the right axilla deep to the musculature there is a 3.8 x 2.1 x 3.3 cm dilated enhancing masslike region that has the appearance of an  ectatic vessel. It does not appear tortuous but may represent the presence of a lymphangioma in the right axilla.  No overt features of malignancy are appreciated but it may be prudent to consider sampling 1 lesions in the right breast, namely the lesion at the 12 o'clock position or possibly the lesion in the right axilla/axillary tail that appears less vascular.  No other areas of abnormal enhancement or morphology are seen within the right breast. There are no findings suspicious for malignancy in the left breast.   BI-RADS 4: Suspicious.    12/5/2024, Bilateral Diagnostic MMG with [place] & Right Breast US ([facility]):  MMG:  The breasts are heterogeneously dense, which may obscure small masses.  Benign-appearing postsurgical changes are identified in the right  breast. An oval asymmetry in the  upper posterior right breast on the MLO view is not significantly changed from 2018. A partially imaged  asymmetry and calcifications projecting over the right axilla on the MLO view and a partially imaged asymmetry in the lower posterior right breast on the MLO view are also similar to 2018. There are no new suspicious masses, calcifications, or areas of architectural distortion in either breast.  ULTRASOUND:  Targeted sonographic evaluation of the right breast was performed for follow-up.  At 1:00, 7 cm from the nipple, there is a 1.5 x 0.9 x 1.3 cm oval hypoechoic mass, which is not significantly changed from 12/26/2018, previously 1.4 x 1.0 x 1.1 cm. This can be considered benign.At 6:00, 5 cm from the nipple, there is an approximately 5.5 x 1 x 2.cm elongated anechoic cyst or collection, which is similar when accounting for differences in technique, previously 5.1 x 0.8 x 2.8 cm.This corresponds to a T2 hyperintense nonenhancing structure on prior MRI from 2018. This can be considered benign.  In the right axilla, there is an approximately 2.4 x 1.1 x 2.4 cm cystic containing structure, which is not significantly changed from 12/26/2018 when remeasured in a similar fashion, previously 2.2 x 1.3 x 2.2 cm. This region was also previously evaluated with MRI. This may be related to the patient's reported lymphangioma.  BI-RADS 2: Benign     12/5/2024, Bilateral Breast MRI ([facility]):  There is scattered fibroglandular tissue. There is mild background parenchymal enhancement.   RIGHT BREAST:    At 12:00 in the posterior right breast, there is a 1.3 cm oval enhancing mass, which is not significantly changed from 2018. This corresponds to a mass on same date mammogram and ultrasound. This can be considered benign.   Centered at 5-6 o'clock in the posterior right breast, there is an at least 4.5 cm T2 hyperintense tubular branching nonenhancing structure, which is similar to slightly larger from 2018. This could be  related to the patient's reported known lymphangioma.  No suspicious enhancing mass or area of non-mass enhancement is identified. There are dilated right axillary veins measuring up to approximately 2.5 cm in diameter, similar to slightly progressed from prior MRI from 2018.  The relatively ill-defined T2 hyperintense signal and stranding within the right axilla, which extends inferiorly along the lateral right chest  wall, where there is evidence of prior surgery. This is grossly similar to prior. Within the right axillary tail, there is an approximately 1.5  x 1.1 x 2.1 cm enhancing mass, which is similar to 2018, previously 1.6 x 0.9 x 2.2 cm when remeasured in a similar fashion. This can be  considered benign.  LEFT BREAST:    No suspicious enhancing mass or area of non-mass enhancement is identified. The visualized axilla is within normal limits.  EXTRAMAMMARY FINDINGS:  There are no pathologically enlarged internal mammary chain lymph nodes on either side.     BI-RADS Category 2: Benign        Review of Systems:  See interval history.       Medications:    Current Outpatient Medications:     HYDROcodone-acetaminophen (NORCO)  MG per tablet, , Disp: , Rfl:     zolpidem (AMBIEN) 5 MG tablet, Take 1 tablet by mouth., Disp: , Rfl:     ibuprofen (ADVIL,MOTRIN) 600 MG tablet, Take 1 tablet by mouth Every 6 (Six) Hours As Needed for Mild Pain. (Patient not taking: Reported on 7/18/2023), Disp: 60 tablet, Rfl: 0    levonorgestrel (MIRENA) 20 MCG/24HR IUD, To be inserted one time by prescriber., Disp: 1 each, Rfl: 0    SYNTHROID 175 MCG tablet, Take 1 tablet by mouth Daily., Disp: , Rfl:       Allergies   Allergen Reactions    Latex Irritability     Other reaction(s): Irritability       Family History   Problem Relation Age of Onset    Breast cancer Neg Hx     Ovarian cancer Neg Hx     Uterine cancer Neg Hx     Colon cancer Neg Hx     Malig Hyperthermia Neg Hx      Objective   PHYSICAL EXAMINATION:   Vitals:     12/18/24 1413   BP: 122/78   Pulse: 98   Resp: 17   SpO2: 99%     ECOG 0 - Asymptomatic  General: NAD, well appearing  Psych: a&o x 3, normal mood and affect  Eyes: EOMI, no scleral icterus  Mouth: Lips are asymmetric. Can only partially open her mouth.   Neck: Horizontal scar on the right anterior neck. Entire neck is enlarged and thickened with dense tissue. Can only rotate about 45 degrees to either the left or right.   MSK: Right arm is hypertrophic. There are multiple scars on the anterior and posterior, upper and lower, right arm. On the right hand, the index and middle fingers are enlarged and firm. Fresh surgical incisions on the left hand and forearm.   Lymph nodes: No left axillary lymphadenopathy. The right axilla has a large scar which is retracted and tight.   Breast: left is ~3x larger than right  Right: On inspection there is a circumferential periareolar scar as well as a large chest wall scar inferior and lateral to the breast. There are multiple other smaller scars in the right upper chest and axilla. No discrete masses or nipple abnormalities.  Left: On inspection there is an inferior periareolar scar, otherwise no visible abnormalities while seated, with arms raised or hands on hips. No masses, skin changes, or nipple abnormalities.      I have independently reviewed her imaging and here are my findings:   Stable enhancing masses and cystic lesions in the right breast.  No suspicious findings in the left breast on imaging.    Assessment & Plan   Assessment:  44 y.o. F with an abnormal appearance of the right breast on imaging, including multiple enhancing cystic lesions and masses.  However this appearance has been stable for 6 years and is considered benign.  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    Plan:  -Going forward she just will need annual screening mammogram with tomosynthesis.  She  will be due for this in 12/2025.  She can follow-up with me as necessary.  If she ever has any concerns with her breasts on clinical exam or questionable imaging findings, I would like to see her back.  -She still is interested in reconstructive surgery to obtain better symmetry and I will refer her to Dr. Grier.    Julieta Mahmood MD      CC:  Tamika Escalona MD

## 2024-12-18 NOTE — LETTER
"December 18, 2024     Tamika Escalona MD  [address]  [address]  [facility] [address]    Patient: Danya Frost   YOB: 1980   Date of Visit: 12/18/2024     Dear Tamika Escalona MD:       Thank you for referring Danya Frost to me for evaluation. Below are the relevant portions of my assessment and plan of care.    If you have questions, please do not hesitate to call me. I look forward to following Danya along with you.         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 Danya Frost is a 37 yo woman, seen at the request of Tamika Escalona MD, for abnormal breast imaging. 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 She has a very complicated past surgical history and underwent so many operations on her jaw, right neck, chest, and upper extremity that she has lost count. Most of the operations happened when she was a small child at [facility]. At one of the initial operations, which sounds like it was a large volume resection involving her neck, axilla, and upper right chest, it is believed that her right breast bud was partially removed, resulting in incomplete development.     The right half of her tongue is also involved. In the remote past she underwent laser surgery on her tongue for small \"blisters.\" Currently, it will intermittently start to tingle and get swollen. She is not sure exactly what provokes this, but when it happens it is usually relieved by ibuprofen. She also has chronic, stable reduced neck mobility. When she was 13 yo, she developed " "progressive reduced jaw mobility and subsequently underwent a large reconstructive procedure on her maxilla and mandible. The multiple surgeries on her right arm have all been for \"tumor or cyst\" removal. Her right index and middle finger are also involved and have undergone reconstructive procedures. She can partially flex her middle finger but cannot flex her index finger at all.     She used to be followed by plastic surgeons, Dr. John Hackett ([facility]) and Dr. Gio Zeng ([facility]), who have since retired. In 2002, she began the process of breast reconstruction with tissue expanders and implants. This was complicated by multiple infections, which ultimately ended with an admission in 12/2003 for an MRSA infection that caused spontaneous rupture of the skin over the prosthesis. Bilateral implants were removed and never replaced, as it was felt that her body could not tolerate a foreign object due to the biologic and anatomic variation caused by the lymphangioma. She reports that she also had multiple admissions throughout her childhood for various infections requiring antibiotics.     In 12/2017, she injured her right chest while carrying heavy box at work (she is a manager at [employer]). She did not break the skin. However, she subsequently develop a large swollen area in her right axilla. She was eventually admitted to [facility] with bacteremia and required inpatient followed by outpatient IV antibiotics. She had a right axillary ultrasound done during admission which showed hypoechoic structures with vascular flow, possibly related to the known lymphangioma, but no drainable fluid collection. The right axillary swelling and pain eventually resolved with antibiotics.     She reports that she had a mammogram in 2003 that showed an abnormal finding on the right which was ultimately determined to be related to the lymphangioma, however these records are no longer available. She denies any prior history of breast biopsies. " She denies any family history of breast or ovarian cancer.     In early September 2018 she developed some left-sided breast pain. She denies any associated breast lumps, skin changes, or nipple discharge. The pain has since resolved. As part of the workup for the left breast pain she underwent bilateral breast imaging which found several incidental cystic lesions on the right (see imaging report details in breast history section below).    1/14/2019:  Since her last visit, she underwent right diagnostic MMG and US and bilateral breast MRI, see imaging report details in breast history section below. She also was seen by Dr. Baez. She denies any new complaints. She is on a new diet and has lost about 7 pounds in the last month.      12/18/2024, Interval History:  I have not seen Ms. Frost in about 6 years. At that point we had decided to follow the abnormal appearing areas in her right breast with imaging surveillance. She also saw Dr. Baez and discussed possible reconstructive surgery to obtain better symmetry. At the time, he wondered if the lymphangioma diagnosis was completely accurate and thought she may have more of a hemihypertropy of both the bone and soft tissue of the upper extremity.  She decided to hold off on reconstruction at that time.  Today she explains that she has not been seen in 6 years because she could not afford the breast imaging and had other more pressing health concerns.  She has had 5 surgeries in the past year, including cervical fusion and multiple bilateral upper extremity surgeries.  Imaging done at an outside hospital in the past year again noted the abnormal appearance of her right breast, which prompted her to follow-up with me again.      Breast history/imaging (updated 12/18/2024):     9/20/18, Bilateral diagnostic MMG and Bilateral US ([facility]):  MMG: Heterogeneously dense.  1. There is no suspicious finding in the region of clinical concern. No masses, suspicious  microcalcifications or architectural distortion are identified in the left breast.  2. There is a high density, oval mass measuring 14 mm with circumscribed margins seen in the posterior one-third region of the right breast at 1:00 located 7 cm from the nipple.  3. There is an irregular mass measuring 22 mm with circumscribed margins in the right axilla located 10 cm from the nipple.  US:  1. There is no evidence of any solid mass or abnormal cystic elements in the left breast.  2. Ultrasound is suggestive of an oval complicated cyst versus solid mass with partially defined margins measuring 14 x 10 x 11 mm. Internal echotexture is heterogeneous and hypoechoic relative to adipose tissue.  3. Ultrasound is suggestive of a cluster of cyst with well-defined, thin margins measuring 22 x 7 x 8 mm. Internal architecture is anechoic.  4. There is an oval elongated debris-containing cyst with well-defined, thin margins measuring 5.1 x 0.9 x 2.5 cm seen in the right breast at 6:00.  IMPRESSION:  1. Area in the left breast is benign-negative.  2. Complicated cyst versus solid mass in the posterior one-third region of the right breast at 1:00 located 7 cm from the nipple is suspicious. Ultrasound-guided cyst aspiration is recommended to be followed by ultrasound-guided biopsy it cyst aspiration is unsuccessful.  3. Cluster of cysts in the right axilla located 10 cm from the nipple is suspicious. Ultrasound-guided cyst aspiration is recommended.  4. Debris-containing cyst in the right breast at 6:00 is suspicious. Ultrasound guided cyst aspiration is recommended.  BIRADS 4, Suspicious.     12/26/18, Right diagnostic MMG, Right US, & Bilateral Breast MRI ([facility]):  MMG:  Marker indicates a right breast skin scar in the upper-outer quadrant. Deep to the marker, there are linear areas of scarring similar to the 09/20/2018 examination. No suspicious calcifications within the right breast. Tomosynthesis demonstrates a circumscribed  15 mm nodule at the approximate 1:00 position. Along the right axilla there are several separate clustered circumscribed nodules or multiloculated nodular structure. It is similar in appearance to the previous mammogram as well.  No new breast mass.  US:   At the 1:00 position, 7 cm from the nipple there is a 1.4 x 1.0 x 1.1 cm complex cyst devoid of internal flow.  At the 6:00 position, 5 cm from the nipple, there is again demonstrated a spindle-shaped complex fluid collection similar to the previous  examination measuring 5.1 x 0.8 x 2.8 cm.  Located in the right axilla there is a collection of cysts, both simple and complex which are individually all subcentimeter in size. Together they measure 2.2 x 0.9 x 0.7 cm, similar to the previous examination.  MRI:  There is asymmetry in breast size, right smaller than left in keeping with prior right breast surgery. In the posterior 1/3 of the  right breast at the 12 o'clock position there is a T1 hypointense weakly enhancing mass that measures 1.8 cm in the anterior posterior dimension, 1.2 cm in the medial lateral dimension, and 1.5 cm in the superior inferior dimension. A metallic clip is not seen in association with the mass.  In the axillary tail region of the right breast there is a T2 hyperintense lobulated mass that measures on the order of 1.6 cm in the  medial lateral dimension, 2.4 cm in the superior inferior dimension, and 1.3 cm in the anterior posterior dimension. Linear areas of increased T2 signal are seen in the subcutaneous region of the right axillary tail and right axilla. These likely represent areas of lymphangioma.  In the right axilla deep to the musculature there is a 3.8 x 2.1 x 3.3 cm dilated enhancing masslike region that has the appearance of an  ectatic vessel. It does not appear tortuous but may represent the presence of a lymphangioma in the right axilla.  No overt features of malignancy are appreciated but it may be prudent to consider  sampling 1 lesions in the right breast, namely the lesion at the 12 o'clock position or possibly the lesion in the right axilla/axillary tail that appears less vascular.  No other areas of abnormal enhancement or morphology are seen within the right breast. There are no findings suspicious for malignancy in the left breast.   BI-RADS 4: Suspicious.    12/5/2024, Bilateral Diagnostic MMG with [place] & Right Breast US ([facility]):  MMG:  The breasts are heterogeneously dense, which may obscure small masses.  Benign-appearing postsurgical changes are identified in the right  breast. An oval asymmetry in the upper posterior right breast on the MLO view is not significantly changed from 2018. A partially imaged  asymmetry and calcifications projecting over the right axilla on the MLO view and a partially imaged asymmetry in the lower posterior right breast on the MLO view are also similar to 2018. There are no new suspicious masses, calcifications, or areas of architectural distortion in either breast.  ULTRASOUND:  Targeted sonographic evaluation of the right breast was performed for follow-up.  At 1:00, 7 cm from the nipple, there is a 1.5 x 0.9 x 1.3 cm oval hypoechoic mass, which is not significantly changed from 12/26/2018, previously 1.4 x 1.0 x 1.1 cm. This can be considered benign.At 6:00, 5 cm from the nipple, there is an approximately 5.5 x 1 x 2.cm elongated anechoic cyst or collection, which is similar when accounting for differences in technique, previously 5.1 x 0.8 x 2.8 cm.This corresponds to a T2 hyperintense nonenhancing structure on prior MRI from 2018. This can be considered benign.  In the right axilla, there is an approximately 2.4 x 1.1 x 2.4 cm cystic containing structure, which is not significantly changed from 12/26/2018 when remeasured in a similar fashion, previously 2.2 x 1.3 x 2.2 cm. This region was also previously evaluated with MRI. This may be related to the patient's reported  lymphangioma.  BI-RADS 2: Benign     12/5/2024, Bilateral Breast MRI ([facility]):  There is scattered fibroglandular tissue. There is mild background parenchymal enhancement.   RIGHT BREAST:    At 12:00 in the posterior right breast, there is a 1.3 cm oval enhancing mass, which is not significantly changed from 2018. This corresponds to a mass on same date mammogram and ultrasound. This can be considered benign.   Centered at 5-6 o'clock in the posterior right breast, there is an at least 4.5 cm T2 hyperintense tubular branching nonenhancing structure, which is similar to slightly larger from 2018. This could be related to the patient's reported known lymphangioma.  No suspicious enhancing mass or area of non-mass enhancement is identified. There are dilated right axillary veins measuring up to approximately 2.5 cm in diameter, similar to slightly progressed from prior MRI from 2018.  The relatively ill-defined T2 hyperintense signal and stranding within the right axilla, which extends inferiorly along the lateral right chest  wall, where there is evidence of prior surgery. This is grossly similar to prior. Within the right axillary tail, there is an approximately 1.5  x 1.1 x 2.1 cm enhancing mass, which is similar to 2018, previously 1.6 x 0.9 x 2.2 cm when remeasured in a similar fashion. This can be  considered benign.  LEFT BREAST:    No suspicious enhancing mass or area of non-mass enhancement is identified. The visualized axilla is within normal limits.  EXTRAMAMMARY FINDINGS:  There are no pathologically enlarged internal mammary chain lymph nodes on either side.     BI-RADS Category 2: Benign        Review of Systems:  See interval history.       Medications:    Current Outpatient Medications:   •  HYDROcodone-acetaminophen (NORCO)  MG per tablet, , Disp: , Rfl:   •  zolpidem (AMBIEN) 5 MG tablet, Take 1 tablet by mouth., Disp: , Rfl:   •  ibuprofen (ADVIL,MOTRIN) 600 MG tablet, Take 1 tablet by mouth  Every 6 (Six) Hours As Needed for Mild Pain. (Patient not taking: Reported on 7/18/2023), Disp: 60 tablet, Rfl: 0  •  levonorgestrel (MIRENA) 20 MCG/24HR IUD, To be inserted one time by prescriber., Disp: 1 each, Rfl: 0  •  SYNTHROID 175 MCG tablet, Take 1 tablet by mouth Daily., Disp: , Rfl:       Allergies   Allergen Reactions   • Latex Irritability     Other reaction(s): Irritability       Family History   Problem Relation Age of Onset   • Breast cancer Neg Hx    • Ovarian cancer Neg Hx    • Uterine cancer Neg Hx    • Colon cancer Neg Hx    • Malig Hyperthermia Neg Hx      Objective  PHYSICAL EXAMINATION:   Vitals:    12/18/24 1413   BP: 122/78   Pulse: 98   Resp: 17   SpO2: 99%     ECOG 0 - Asymptomatic  General: NAD, well appearing  Psych: a&o x 3, normal mood and affect  Eyes: EOMI, no scleral icterus  Mouth: Lips are asymmetric. Can only partially open her mouth.   Neck: Horizontal scar on the right anterior neck. Entire neck is enlarged and thickened with dense tissue. Can only rotate about 45 degrees to either the left or right.   MSK: Right arm is hypertrophic. There are multiple scars on the anterior and posterior, upper and lower, right arm. On the right hand, the index and middle fingers are enlarged and firm. Fresh surgical incisions on the left hand and forearm.   Lymph nodes: No left axillary lymphadenopathy. The right axilla has a large scar which is retracted and tight.   Breast: left is ~3x larger than right  Right: On inspection there is a circumferential periareolar scar as well as a large chest wall scar inferior and lateral to the breast. There are multiple other smaller scars in the right upper chest and axilla. No discrete masses or nipple abnormalities.  Left: On inspection there is an inferior periareolar scar, otherwise no visible abnormalities while seated, with arms raised or hands on hips. No masses, skin changes, or nipple abnormalities.      I have independently reviewed her imaging  and here are my findings:   Stable enhancing masses and cystic lesions in the right breast.  No suspicious findings in the left breast on imaging.    Assessment & Plan  Assessment:  44 y.o. F with an abnormal appearance of the right breast on imaging, including multiple enhancing cystic lesions and masses.  However this appearance has been stable for 6 years and is considered benign.  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    Plan:  -Going forward she just will need annual screening mammogram with tomosynthesis.  She will be due for this in 12/2025.  She can follow-up with me as necessary.  If she ever has any concerns with her breasts on clinical exam or questionable imaging findings, I would like to see her back.  -She still is interested in reconstructive surgery to obtain better symmetry and I will refer her to Dr. Grier.    Julieta Mahmood MD      CC:  Tamika Escalona MD

## 2024-12-19 ENCOUNTER — TELEPHONE (OUTPATIENT)
Dept: SURGERY | Facility: CLINIC | Age: 44
End: 2024-12-19
Payer: COMMERCIAL

## 2024-12-19 NOTE — TELEPHONE ENCOUNTER
Pt notified her appt with Dr. Bernardino Grier is scheduled on 1/28/2025 at 3:00.  That office reached out to her for registration and location information.     CMA

## 2025-04-11 NOTE — PROGRESS NOTES
Chief Complaint  Peripheral Neuropathy    Subjective            Danya Frost presents to [facility] NEUROLOGY for NEUROPATHY (BUE)  History of Present Illness  New patient referred by Dr. Arevalo        Onset:2024  Patient states cold weather triggers her symptoms.   Heat and medication improves her symptoms.   Location: BUE  Symptoms:: shaky movements, can't rase arm to back of her head..  She has 5 operations in 2024, fusions neck and spine.   Cubicle tunnel release and web release.   On both arms there was a large amount of pain.   They don't  know why the nerves started to stop working.   right arm edema since child rudi, arm worked ok    5 years ago started having severe neck pain  Went to [facility] spine surgery, compression at C7 with spinal cord compression  Surgery recommended, but pt declined    Left arm emg, then ulnar release surgery 2023,    Then left arm increased problems    Jan 24 right arm could not pull hair back, increase neck problems  Then righ arm weakness, Went to see second opinion re cervical spine, feb 24    Dr Barksdale, evaluation. Having nerve pain in arms, , emg determined not related to problems in the cervical spine.   No leg symptoms, or bowel or bladder difficulty   Had surgery march 2024, cervical spine surgery,   Hoping that the pain in the arms would be relieved. But was not improved with the surgery.     After surgery, left arm felt to be getting worse.   The thumb adducted to index finger, stiffness, and some spasm thumb extend, distal phalanx   Concerned why the thumb drawing, with out control   Had botox hoped this would release the thumb extension but it did not  Has had significant PT may have helped some     Saw dr dr june June or July, ordered mri studies, late summer, first worked on the right.   ( After surgery the right arm stayed about the same as before surgery)     Radial median and ulnar release on right in 2024  Left arm ulnar, median and  muscle release.     Left small finger is completely numb.  No numbness radial or median in left hand    Right hand had wrist drop which improved but can't extend fingers.     No progressive symptoms after Dr Denise surgery Jan 2024    Pregabalin helps with the nerve pain,     Has not been able to extend the left elbow life long,   Ulnar surgery did not help the ulnar nerve     Before surgery unable to put right arm behind head     The surgeries together, has helped relieve the pain.     Developed emmanuelle after surgery , now on CPAP. 34 ahi sees Dr. 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 Contracture of muscle of left hand   3. Contracture of joint of hand <Left side>   4. Carpal tunnel syndrome of bilateral upper limbs   =====================hand post op  Adarsh Allen MD - 12/31/2024 10:30 AM EST    [facility]  Bilateral Wrist and Thumb Doing well after surgery  last month  2 weeks status post R CT release  4 weeks status post L 1st web contracture Z plasty  Pt is doing well, denies pain or numbness  Well healed surgical wounds with no signs of infection noted.  She is going to OT 3 times / wk   ===================--hand surgery dec 2024  Mandeep Jarrett MD - 12/03/2024 1:15 PM EST    Pre-op: surgery date: 12/06/2024.  First web space sever contracture  Almost no passive separation  Over extension of the IPJ of the thumb    Right Hand - Follow-up  Pre-op: Surgery date;  12/20/2024  Right Carpal tunnel release  --------------------------------------  Refer to Dr. Seipel for spasticity left hand   =====================hand surgery   Mandeep Jarrett MD - 11/06/2024 11:00 AM EST  [facility]  Patient presents with  Left Hand - Injections  Follow Up for Botox injections  Left Th Adductor and EPL spastic contracture, R PIN palsy, Left Ant interosseous nerve palsy, BL CTS, L severe CuTS, F/U 2 weeks after botox injection. Says that she does't feel any change  Hand/Wrist Musculoskeletal Exam  L hand  First web space sever contracture  Almost no passive separation  Over extension of the IPJ of the thumb  Visit Diagnoses:  1. Upper limb spasticity  2. Carpal tunnel syndrome of bilateral upper limbs  3. 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 e candidate for 1st interosseous release and web space z plasty  Procedures  The patient will need surgery. I explained to them this is a procedure done under anesthesia and we will use arm blocks with mild sedation. The surgery has its own risks. The most common risks are pain, bleeding, hematoma, vessel or nerve injury, skin detachment, rupture of sutures, stiffness, worsening of underlying diseases such as diabetes, Dupuytren's contracture, rheumatoid disease, osteoarthritis. There could be significant stiffness if they do not follow the instructions. There is always a 1% risk of infection most commonly related to the patient's touching the wounds. I instructed them not to touch the wounds until the sutures are completely dissolved.  Surgery Schedule for: Left Hand 1st web release, Adductor and first dorsal interosseous myotomy, tenotomy, release of origin and lengthening of Extensor pollicis longus, Possible double z- plasty of first web skin  Refer to Dr. Seipel for evaluation for  the spasticity  Work Status:  [Adolph Hester MD  [facility]  11/6/2024  Cosigned by Alex Arevalo MD at 11/06/2024 12:14 PM EST  =======================mri arm    MRI RADIUS ULNA LEFT WO AND W CONTRAST, MRI HUMERUS LEFT WO AND W  CONTRAST 06/19/2024 4:12 PM  LEFT HUMERUS:  Examination is motion degraded on several sequences. Examination is  also limited by patient body habitus and large field-of-view images.  There is significant diffuse thickening, increased T2 signal, and  enhancement of the ulnar nerve fascicles as it courses along the  medial head of the triceps musculature to the level of the cubital  tunnel. There is no discontinuity of the nerve fascicles. No  perineural fluid collection or mass is evident.  There is mild diffuse thickening and increased STIR signal of the  radial nerve. No perineural fluid collection or mass is evident. There  is relative preservation of the median nerve, which is not well  visualized.  There is mild increased STIR signal in the triceps and teres minor  musculature with preserved muscle bulk.    IMPRESSION:  1. Significant diffuse thickening of the left ulnar nerve with  increased STIR signal and enhancement suggestive of neuropathy. No  perineural mass or fluid collection is identified.  2. Mild diffuse thickening and increased STIR signal of the radial  nerve suggestive of neuropathy. No perineural mass or fluid collection  identified.  3. Mildly increased STIR signal in the triceps and teres minor  musculature suggestive of acute denervation change without fatty  atrophy.  LEFT RADIUS/ULNA:  There is diffuse thickening of the ulnar nerve from the level of the  cubital tunnel to the wrist with mildly increased STIR signal, but no  perceptible enhancement. There is no discontinuity of the nerve  fascicles. No perineural soft tissue fluid collection or mass is  evident. No excessive musculature is identified in the cubital tunnel.    There is  increased STIR signal throughout the pronator quadratus,  extensor carpi ulnaris, extensor digiti minimi, extensor indices, and  extensor pollicis longus musculature with relatively preserved muscle  bulk. The flexor digitorum profundus and flexor carpi ulnaris  musculature demonstrates normal bulk and signal.    The visualized portions of the median and radial nerves are normal in  signal and morphology.    IMPRESSION:  1. Diffuse thickening of the ulnar nerve from the level of the cubital  tunnel to the wrist with mildly increased STIR signal suggestive of  neuropathy. No perineural soft tissue fluid collection or mass  identified.  2. Increased STIR signal in the pronator quadratus and extensor  musculature suggestive of acute degeneration change in the radial  nerve distribution.  3. No denervation changes identified in the flexor digitorum profundus  and flexor carpi ulnaris musculature corresponding to the ulnar nerve  distribution.  =======================neurosurgery fu  Chinmay Silver MD - 06/19/2024 9:00 AM EDT  WF, presents alone. She is 1 month s/p bilateral C2-T2 instrumented posterolateral fusion, C4-7 laminectomy, right C6/7 foraminotomy 3/14/24 at . She is doing well from the above surgery and has no complaints. She had right arm MRI neurography and will have left done today. She will s/w Dr. Johnson about results and treatment plan soon.  Physical Exam: Basic Exampost cervical incision well-healed gait is normal she has difficulty with tandem gait right  is 3right hand intrinsics are 3 finger and wrist extensions are 3 on the right left hand  and hand intrinsics are 3.  Data: [facility] cervical spine radiograph dated 6/19/2024 demonstrates good graft and hardware position status post C2-T2 instrumented fusion and decompression without complication.  Presents today for follow-up. We reviewed her radiograph together. He is seeing Dr. Johnson next week for a plan regarding her peripheral  nerve issues. I will see her back in the office in 6 months with cervical radiographs.  ========================pcp  Daniele Cabral MD - 05/28/2024 11:00 AM EDT  Danya Jo is a 43 y.o. female here to discuss her ongoing issues. She is scheduled to see Dr. Johnson for her hand issues. She is also dealing with persistent neck issues. She describes the pain as sharp in nature and unrelenting. She states that gabapentin doesn't seem to be helping.   ---------------------  Cervical radiculopathy  Carpal tunnel syndrome of bilateral upper limbs  History of cervical spine fusion  Will taper off of gabapentin and on to lyrica over the next few weeks  =========================mri elbow/humerus  EXAMINATION: MRI ELBOW LT WO CON  DATE: 05/07/2024  IMPRESSION: Swelling and T2 prolongation of the imaged portions of the  ulnar nerve as described above but without evidence of ongoing  compression.  EXAMINATION: MRI HUMERUS RIGHT WO CONTRAST  1. Slight thickening T2 prolongation of the right radial nerve as  described above with no focal compressing soft tissue mass identified.  2. Generalized subcutaneous edema.    =================neurosurgery follow upo  Chinmay Silver MD - 04/15/2024 3:15 PM EDT  History of Present Illness:  LHWF, presents with a guest. She is 1 month s/p bilateral C2-T2 instrumented posterolateral fusion, C4-7 laminectomy, right C6/7 foraminotomy 3/14/24 at . She is doing well today with no complaints.  Physical Exam: Basic Exam post cervical incision well-healed gait is normal she has difficulty with tandem gait right  is 4 right hand intrinsics are 3 finger and wrist extensions are 3 on the right left hand  and hand intrinsics are 4+.  Data: [facility] cervical radiograph dated 4/15/2024 demonstrates good graft and hardware position status post C2-T2 instrumented posterolateral fusion without complication.  Assessment, Management and Plan:  Danya is now 1 month status post  C2-T2 instrumented posterolateral fusion and C4-7 laminectomy. She has had no wound troubles she is doing well from a cervical surgery standpoint. She is progressively losing function in her hands with progressive discomfort. This appears to be related to her multiple peripheral neuropathies. I will enroll her in physical therapy and Occupational Therapy will keep her off work. I will attempt to get her to see Dr. Johnson sooner than her scheduled appointment. See her back in the office in 2 months with a cervical radiograph.  ===================hand  Heath Reinoso MD - 04/10/2024 12:10 PM EDT  Formatting of this note is different from the original.  [facility]  Danya Jo is a 43 yr/o female who presents with bilateral hand pain  Right arm has chronic lymphedema  Trouble extending fingers on right side as well as wrist  Also has pain in left hand with weakness  Had cubital tunnel surgery on left elbow one year ago with Dr. Raza Bowman  Has been taking gabapentin for pain  Spine surgery cervical sensosis  Surgery march 14 for cervical spine  Had EMG done before hand  Dr. Monique performed surgery for index and middle fingers  -------------------------  Left upper Extremity  Well-healed scar along medial aspect of elbow  Intrinsic atrophy present  Difficulty abducting or abducting digits  Phalen's or elbow flexion test do not seem symptoms much  Sensation decreased to light touch  Right upper extremity  Well-healed scars over index and middle finger dorsally, both of these digits are enlarged  Hypertrophy present throughout right upper extremity  Phalen's or elbow flexion testing at change symptoms much  Able to extend wrist just past neutral but weak  Phalen's or elbow flexion test did not seem symptoms much  Difficulty extending fingers at MP joints  Sensation decreased in thumb along dorsal aspect  Assessment/Plan  Patient with complex problems. Had nerve conduction study recently done by Dr. Barksdale  which diagnosed radial nerve palsy somewhere in the distal third of the right arm as well as compression of the ulnar nerve and bilateral carpal tunnel syndrome. MRIs were recommended to evaluate sites of compression on both arms. Given patient's chronic atrophy on the left side, revision surgery would likely not help with recovery of motor function however may provide some pain relief. Radial nerve palsy appears to have an unusual presentation. Patient reports no improvement with spine surgery. MRI may be helpful to evaluate possible site of compression  ===============pcp  2024  Daniele Cabral MD - 04/09/2024 3:45 PM EDT  [facility]  [facility]  Danya Alondra Jo is a 43 y.o. female here for continued neurological issues. She had a surgery through Dr. Bernard and is gradually recovering from this. She states that her neuropathy continues to be a huge issue. She has bilateral arm pain and continued CTS issues. She states that this is causing her to have some depression as well.  -----------------  Carpal tunnel syndrome of bilateral upper limbs  - Hand Surgery referral  - gabapentin (Neurontin) 300 MG capsule; Take one in the morning, one at noon and two at night  ================post op   1. Encounter for removal of staples :   Patient presents 2 weeks s/p C2-T2 posterior fusion with C4-7 laminectomy with right C6-7 foraminotomy for severe stenosis and bilateral foraminal stenosis for staple removal. Staples removed without difficulty. Incision well healed without signs of infection. She will f/u at her previously scheduled f/u appt. She should expect a call from OT and PT within the next week or two.   STEVE Maloney  [facility]  4/8/2024   ==============neurosurgery fu vist   Physical Exam: Basic Examhealed right transverse cervical incision. Gait is normal she has difficulty with tandem gait right  is 4 right hand intrinsics are 3 finger and wrist  extension are 3 on the right. She is areflexic in the upper extremities and hyperreflexic in the lower extremities.     Data: [facility]G [facility]MRI scan of the cervical spine from [facility] imaging 2/12/24at C4-5 moderate disc osteophyte complex especially in the right paracentral root region severe central canal stenosis moderate bilateral neuroforaminal stenosis at C5-6 large disc osteophyte complex critical central canal stenosis mild bilateral neuroforaminal stenosis C6-7 large disc osteophyte complex severe right foraminal stenosis moderate to severe left foraminal stenosis severe cord compression. There is loss of volume in the cord below this level at C7-T1 moderate disc osteophyte complex severe central stenosis moderate to severe left neuroforaminal stenosis mild to moderate right neuroforaminal stenosis.    electrodiagnostic studies dated 2/12/2024A severe demyelinating (with conduction block) and axonal loss injury of the right radial nerve distal to the innervation of the triceps muscles and proximal to the innervation of the brachioradialis (in the region of the distal third of the arm).   A severe demyelinating and axonal loss injury of the median nerve across the wrist (motor and sensory).    CT scan of the cervical spine dated 2/14/2023 extensive ossification of the posterior longitudinal ligament from C4 down through C7.  Visit Diagnoses:   No diagnosis found.    Assessment, Management and Plan:   Jhonny Hagan presents today for follow-up I reviewed her MRI scan CT scan and electrodiagnostic imaging studies with her. I have offered a C2-T2 instrumented posterolateral fusion with C4-7 laminectomy with right C6-7 foraminotomy. I described the surgery in detail reviewed the risk benefits alternatives with her. She understands and wishes to proceed. Prior to surgery we will have her obtain a repeat electrodiagnostic study with Dr. Barksdale to determine whether there is a right C7 component to her wrist and  finger drop.    Discussions/Consents:   Cervical Discussions: POSTERIOR CERVICAL DECOMPRESSION: The risks, benefits, and alternatives of a posterior cervical decompression were explained in detail to the patient. The alternative is not to perform an operative procedure. The benefit should be, though is not guaranteed, an improvement in the patient's neurosensory and/or motor dysfunction. The risks include, but are not limited to, the possibility of death, infection, stroke, bleeding, blindness, quadriplegia, paraplegia, hemiplegia, monoplegia, lack of improvement or worsening of the pain syndrome, recurrence of the pain syndrome, meningitis, osteomyelitis, recurrent disc herniation, need for further operative intervention, spinal instability, bowel or bladder dysfunction including the possibility of sexual dysfunction, incontinence, inability to urinate without catheterization, inability to have a normal bowel movement, epidural scarring resulting in chronic neck or arm pain, temporary or permanent hoarseness, swallowing difficulties, leakage of cerebral spinal fluid at the time of surgery or after recovery from surgery requiring further operative intervention, etcetera. I also explained the realistic expectations as they pertain to the procedure. The patient voiced understanding of these risks, benefits, alternatives, and realistic expectations.   Other Discussions: ELIA: The patient has been advised after complete physical examination and told all the consequences of medication being prescribed, that a ELIA check has been made on-line, and will be repeated if prescription is renewed within a 90 day period. Patient was informed of the risks and benefits, as well as the complications of drugs prescribed. A consent for Opioid treatment was signed by the patient after this full disclosure.   Consent DIscussions: I sat and discussed the case at length with the patient. I described the procedure in detail and  reviewed risks and potential complications. These include but are not limited to minor or severe spinal cord or nerve root injury causing weakness, paralysis, sensory loss or chronic pain; bleeding; CSF leak; infection; failure to cure current symptoms or worsening of these symptoms; and the risks of anesthesia and the and the stress of surgery. We also discussed the discomfort that could be expected after surgery and typical recovery time. The patient understood these risks and was eager to proceed with surgery.     ========================neurosurgery eval =  Chinmay Silver MD - 2024 12:45 PM EST  Formatting of this note is different from the original.  [facility] - [facility]OSCIENCE CLINIC NOTE    Patient: Danya Jo  Age: 43 y.o.  Sex: female  : 1980  MRN: [id number]  Visit Type: Initial, Referred by: Daniele Cabral MD    Referral from Dr. Daniele Cabral for cervical stenosis with C-MRI at [place], [address], on [facility].    History of Present Illness:  Mrs. Jo is a LHWF who presents here today with her  for evaluation of her cervical spine. She reports long standing symptoms but a worsening about 6 months ago. Currently she has neck heaviness, dully/achy/stabbing neck pain, periscapular pain, tingling to her periscapular area, right hand weakness (has a known radial nerve injury) and trouble sleeping. She has not done PT or injections. Currently takes Meloxicam and tylenol with very little relief. She doesn't use, ice/heat or anything OTC. She has not done any other conservative treatments. Former smoker, quitting in .    She reports having an autofusion in her neck.  ------------------------------  Physical Exam: Basic Exam healed right transverse cervical incision. Gait is normal she has difficulty with tandem gait right  is 4 right hand intrinsics are 3 finger and wrist extension are 3 on the right. She is areflexic in the upper  extremities and hyperreflexic in the lower extremities.    Data: NSG Imaging  MRI scan of the cervical spine from [facility] imaging 2/12/24at C4-5 moderate disc osteophyte complex especially in the right paracentral root region severe central canal stenosis moderate bilateral neuroforaminal stenosis at C5-6 large disc osteophyte complex critical central canal stenosis mild bilateral neuroforaminal stenosis C6-7 large disc osteophyte complex severe right foraminal stenosis moderate to severe left foraminal stenosis severe cord compression. There is loss of volume in the cord below this level at C7-T1 moderate disc osteophyte complex severe central stenosis moderate to severe left neuroforaminal stenosis mild to moderate right neuroforaminal stenosis.    electrodiagnostic studies dated 2/12/2024A severe demyelinating (with conduction block) and axonal loss injury of the right radial nerve distal to the innervation of the triceps muscles and proximal to the innervation of the brachioradialis (in the region of the distal third of the arm).   A severe demyelinating and axonal loss injury of the median nerve across the wrist (motor and sensory).    _____ ________________________  Aelxis Cohn PT [facility]  Board Certified Electromyographe     Visit Diagnoses:   No diagnosis found.    Assessment, Management and Plan:   Jhonny Hagan presents with rest of loss of function in her right hand. I reviewed her MRI scan with her and her . I would like for her to have a CT scan of the cervical spine to rule out ossification of posterior longitudinal ligament. I will see her back in the office next week for further treatment treatment discussion.  --------------------------------  1. Extensive calcification of the posterior longitudinal ligament at multiple levels with severe spinal canal stenosis as seen on MRI posterior to C6.    2. 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    Dictated by: Waldo Santa MD Signed by Waldo Santa MD on 2/15/2024 8:20   =====================jan 2024 orth eval==========  Raza Bowman MD - 01/08/2024 3:00 PM EST  General Orthopaedic Surgery  Adult and Pediatric Musculoskeletal Oncology  DATE OF SURGERY: 04/20/2023-Left Cubital Tunnel Release without Ulnar Nerve Transposition  CHIEF COMPLAINT: Right thumb numbness burning and right hand weakness    Danya Frost is a 43 y.o. who present for followup evaluation of the above. She denies any trauma to the right upper extremity but states over the last 3 weeks she has developed burning and numbness in the right thumb region as well as weakness in the hand. She has a history of a lymphangioma and multiple surgical procedures to the arm forearm and hand. She has had no recent injuries. She denies any compressive close wrist bands watches along the wrist.    Musculoskeletal Exam: Right upper extremity demonstrates well-healed incision over the hand and forearm. Patient has a slight Tinel's in the region of the superficial branch of the radial nerve on the radial border of the distal forearm. Extremity is warm and well-perfused.    Radiographs of the right hand were available for my review and demonstrate hypertrophy of the bones of the first ray second ray and thumb. 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  MEDICAL DECISION MAKING:  Imaging reviewed  Will plan to check an EMG nerve conduction study  Patient will follow-up after the EMG nerve conduction study has been completed.  ====================jan 2024==================  Daniele Cabral MD - 01/02/2024 3:00 PM EST  [facility]  [facility]  [facility]  She states that she has dealt with persistent cervical spine issues for pretty much all of her life. She states that she has seen Dr. Horowitz for this in 2019 and was told that she would likely need spinal surgery to help with this. She states that she has noticed that if she holds an object in her hand she will have weakness in the hand and some involuntary contractions.    =================may fu visit post op ================  ASSESSMENT:    Danya Frost who is approximately 2 weeks s/p the above.    MEDICAL DECISION MAKIN. Incision is healed well continue with local wound therapy as needed  2. 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 I explained I was confident in the room in the release. I did not feel that the nerve warranted transposition given its stability with elbow range of motion.  3. Patient should continue with her activities as tolerated. Plan to see her back in approximately 6 weeks for repeat evaluation, sooner should any concerning symptoms arise.    Total time spent in this visit was 10 minutes. The total time includes but is not limited to review of prior notes, diagnostic results reviewed, diagnostic orders and outpatient management services.    Raza Bowman M.D.  2023  Electronically signed by Raza Bowman MD at 05/10/2023 12:34 PM EDT   =================2023 left cu tunnel surgery=  History of Present Illness:   Danya Frost is a 42 y.o. female who presents for operative intervention regarding the above and diagnosis of cubital tunnel syndrome     Past Medical History:  Medical History        Past Medical History:   Diagnosis Date    Anesthesia complication       PT STATES HAD DIFFICULTY WITH NASAL INTUBATION    Cubital tunnel syndrome       LEFT:NUMBNESS IN LEFT OUTER FOREARM, IN BETWEEN 4TH AND FINGER, AS WELL AS PART OF TOP OF HAND.  WEAKNESS AND PAIN    History of MRSA infection       RIGHT BREAST AFTER BREAST IMPLANTS. TREATED AT [facility]    History of sepsis       SEVERAL YEARS AGO    Hypothyroid      Lymphedema       RIGHT HAND/RIGHT FOREARM:  SINCE CHILDHOOD    Psoriasis       BACK OF NECK            Past Surgical History:  Surgical History         Past Surgical History:   Procedure Laterality Date                                          FINGER SURGERY Right       2ND AND 3RD FINGERS:  REMOVED GROWTH PLATE              OTHER SURGICAL HISTORY Right       MULTIPLE SURGERIES TO REMOVE ANGIOMAS:  RIGHT ARM.  ONGOING LYMPHDEMA IN RIGHT ARM ONLY AND RESTRICTED EXTREMITY                                     =============dec 2019 neurosurgery Dr Horowitz================  ASSESSMENT/PLAN:  OPLL with stenosis and myleopathy  OC-C1-C1 fusion (surgery as an infant)     The patient is a 39 yr/o female presenting with imaging results; neck pain I explained the imaging results to the patient. We discussed that the typical non-operative treatments that we have good evidence for include physical therapy, anti-inflammatory medications, and epidural injections. Thus far, she has tried medications. At this point, I would recommend surgical intervention as her spinal cord is compressed. We have discussed at length, step-wise regression of her symptoms.   ================ER  sept 2019==============  Pt is a 38 yr/o female who presents with neck pain. 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 She had surgery as an infant to remove the mass from the right front part of her neck. She has chronic decreased mobility on the right side of her neck still and chronic swelling to her right arm and right face.. She reports 4 days ago she began to have left sided neck pain. She went to the [facility] and they prescribed muscle relaxers. She has been taking the muscle relaxers at night and has been using heat during the day. This morning when she woke up her pain had  improved, she was moving pretty well and had minimal pain. Her pain worsened again at 1300 today and her neck stiffened up again. She has noted some numbness and tingling to the left side of her scalp at the occiput.   =============hosp  dec 13 2017=======  History of Present Illness: Patient is a 37-year-old female with history of chronic lymph angioma, hypothyroidism who presents with discomfort right chest extending into the axilla. Patient states that the discomfort started actually 2 days prior to admission. She was seen in the emergency room yesterday. She noted that after a day of intense usage, heavy lifting at her job, the following day she woke to find there was significant swelling of her right upper extremity and redness in the right axilla. She is also had diffuse body aches and fever at home. She came to the emergency room yesterday. Arterial and venous Dopplers were completed and within normal limits. She was started on clindamycin. She was discharged home at that time. However patient had return of positive blood culture today and was called back to the emergency room.    Patient has a history of chronic lymphangioma and has had multiple surgeries, reconstructions in the past. Her condition has been stable for the last 10-15 years with no issues. She does not currently follow with a plastic surgeon as he has since retired   ================er otsjx====4902 dec 12=    0715: Reviewed recent publications regarding hereditary lymphangioma the congenital variety has macro versus microcystic lesions with either cripto cystic or cavernous manifestations that can include the neck, mouth, axilla, arm, mediastinum the superficial lesions can lead to cellulitic changes. Her presentation seems to be more consistent with the superficial type lesions involving the axilla and right upper chest wall. Per recent literature treatment appears to be mostly supportive in nature unless the lesions are compressing  significant structures or there are secondary changes. I am concerned for possible arterial compression given her diminished radial pulse and her cold hand and hurt symptoms of tingling which she says she has had for approximately one week. The tingling is isolated to the entire hand in a glovelike distribution per the patient.    0726: rechecked Pt and informed her that Ultrasound has been paged and he is on his way to the hospital.    0950: Pt recheck: Pt has no new arm, chest pain or complaints at this time. Pt is requesting PO, is ambulating. Discussed lab results, including leukocytosis, and radiology findings with pt. Discussed with the pt the plan for IV antibiotics while here in the ED and a script for PO antibiotics at home. Discussed the possibility of SVC syndrome and the need for follow up regarding this. Emphasized the need to f/u with Heme/Onc, PCP in the next 1-2 days and recheck BP, pt understood importance of this. Advised pt to RTER immediately for any new/recurrent/worsening symptoms, including: SOA, chest pain, increased fever, lethargy, vomiting. Discussed plan for light duty at work. All d/c instructions discussed and reviewed and all questions answered. Stable at discharge.     --Copies of labs/radiology testing were reviewed/discussed and/or given to the patient and they were also made aware of online access.    Consult:  3983: spoke with the Ultrasound tech regarding pt's studies. Pt is negative for DVT, SVT, and has a normal venous and arterial study.   ===================2017================  Silvana Núñez APRN - 12/08/2017 1:19 PM EST  Formatting of this note is different from the original.  Subjective:    Patient ID: Danya Jo is a 37 yr/o female.    Danya is here to establish care and for evaluation of numbness and tingling in her hand.    HPI    Danya has a history of congenital lymphangioma, hypothyroid, abnormal pap HPV+.    Congenital lymphangioma with 30+  reconstructive surgeries on right arm/breast/tongue. She reports periodic tongue swelling. She had not had problems with her right arm until recently when she started doing more physical labor at work. She works at [employer], lifting 70+ pounds over the last few weeks. 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 She has noticed intermittent numbness and tingling in her right hand for about 1 week. She denies weakness, decreased ROM from baseline, pain. She has not seen specialists in 15+ years and they have all retired at this point.  ----------------  30+ surgeries for lymphangioma which has affected the right upper body. Right neck, tongue, right arm, forearm, fingers, and right breast.   -----------------------        ====2 views of the cervical spine 12/18/24 COMPARISON: 6/19/24=====  IMPRESSION:   1. Expected postoperative appearance of the cervical spine         ======MRI HUMERUS LEFT WO AND W 6/19/24======  IMPRESSION:   1. Significant diffuse thickening of the left ulnar nerve with   increased STIR signal and enhancement suggestive of neuropathy. No   perineural mass or fluid collection is identified.   2. Mild diffuse thickening and increased STIR signal of the radial   nerve suggestive of neuropathy. No perineural mass or fluid collection   identified.   3. Mildly increased STIR signal in the triceps and teres minor   musculature suggestive of acute denervation change without fatty   atrophy.       =====LEFT RADIUS/ULNA 6/19/24======  IMPRESSION:   1. Diffuse thickening of the ulnar nerve from the level of the cubital   tunnel to the wrist with mildly increased STIR signal suggestive of   neuropathy. No perineural soft tissue fluid collection or mass   identified.   2. Increased STIR signal in the pronator quadratus and extensor   musculature suggestive of acute degeneration change in the radial   nerve distribution.   3. No denervation changes identified in the  flexor digitorum profundus   and flexor carpi ulnaris musculature corresponding to the ulnar nerve   distribution.       Family History   Problem Relation Age of Onset    Neuropathy Mother     Breast cancer Neg Hx     Ovarian cancer Neg Hx     Uterine cancer Neg Hx     Colon cancer Neg Hx     Malig Hyperthermia Neg Hx        Past Medical History:   Diagnosis Date    Anesthesia complication     PT STATES HAD DIFFICULTY WITH NASAL INTUBATION    Cubital tunnel syndrome     LEFT:NUMBNESS IN LEFT OUTER FOREARM, IN BETWEEN 4TH AND FINGER, AS WELL AS PART OF TOP OF HAND.  WEAKNESS AND PAIN    History of MRSA infection 2002    RIGHT BREAST AFTER BREAST IMPLANTS. TREATED AT [facility]    History of sepsis     SEVERAL YEARS AGO    Hypothyroid     Lymphedema     RIGHT HAND/RIGHT FOREARM:  SINCE CHILDHOOD    Psoriasis     BACK OF NECK       Social History     Socioeconomic History    Marital status:     Number of children: 2   Tobacco Use    Smoking status: Never    Smokeless tobacco: Never   Vaping Use    Vaping status: Never Used   Substance and Sexual Activity    Alcohol use: Yes     Comment: MONTHLY    Drug use: No    Sexual activity: Yes     Partners: Male     Birth control/protection: Surgical         Current Outpatient Medications:     pregabalin (LYRICA) 100 MG capsule, Take 1 capsule by mouth 2 (Two) Times a Day., Disp: 60 capsule, Rfl: 5    SYNTHROID 175 MCG tablet, Take 1 tablet by mouth Daily., Disp: , Rfl:     HYDROcodone-acetaminophen (NORCO)  MG per tablet, , Disp: , Rfl:     ibuprofen (ADVIL,MOTRIN) 600 MG tablet, Take 1 tablet by mouth Every 6 (Six) Hours As Needed for Mild Pain. (Patient not taking: Reported on 4/15/2025), Disp: 60 tablet, Rfl: 0    levonorgestrel (MIRENA) 20 MCG/24HR IUD, To be inserted one time by prescriber. (Patient not taking: Reported on 4/15/2025), Disp: 1 each, Rfl: 0    Review of Systems   Constitutional:  Positive for activity change.   Neurological:   "Positive for numbness.   All other systems reviewed and are negative.           Objective   Vital Signs:   /81   Pulse 83   Ht 162.6 cm (64.02\")   Wt 65.3 kg (144 lb)   BMI 24.70 kg/m²     Physical Exam  Vitals reviewed.   Neurological:      Mental Status: She is alert.      Comments: Still has numbness of small and ring finger  Contracture of muscle of left hand  thumb adductor and thumb extensor   Well healed surgical scares of CT surgery and Z plasty              Result Review :                   Assessment and Plan    Diagnoses and all orders for this visit:    1. Spasticity (Primary)    2. Pain in both upper extremities  -     pregabalin (LYRICA) 100 MG capsule; Take 1 capsule by mouth 2 (Two) Times a Day.  Dispense: 60 capsule; Refill: 5      This patient has a very complicated history of difficulties with her upper extremities.  The question of importance at this time and the reason for the referral was why the spasticity of the muscles in the left hand.  Patient was not  aware of the particular reason for the referral.  She has had Botox injections which were not particularly helpful and apparently not repeated.  She ultimately had surgery on the hand after the surgery she continues to have the stiffness and spasticity but the left fourth thumb is more functional.      The reason for the spasticity is not apparent to me.  It is possible there was some cervical spine spinal cord injury related to the spinal stenosis which theoretically could cause spasticity.  She has had good clinical management of the symptoms including a trial with Botox as well as surgery.  Continuing the Lyrica for symptomatic relief is appropriate.      Follow Up   Return in about 6 months (around 10/15/2025).  Patient was given instructions and counseling regarding her condition or for health maintenance advice. Please see specific information pulled into the AVS if appropriate.         This document has been electronically " signed by Joseph Seipel, MD on May 14, 2025 11:15 EDT

## 2025-04-15 ENCOUNTER — OFFICE VISIT (OUTPATIENT)
Dept: NEUROLOGY | Facility: CLINIC | Age: 45
End: 2025-04-15
Payer: COMMERCIAL

## 2025-04-15 VITALS
WEIGHT: 144 LBS | HEART RATE: 83 BPM | SYSTOLIC BLOOD PRESSURE: 135 MMHG | DIASTOLIC BLOOD PRESSURE: 81 MMHG | HEIGHT: 64 IN | BODY MASS INDEX: 24.59 KG/M2

## 2025-04-15 DIAGNOSIS — M79.602 PAIN IN BOTH UPPER EXTREMITIES: ICD-10-CM

## 2025-04-15 DIAGNOSIS — M79.601 PAIN IN BOTH UPPER EXTREMITIES: ICD-10-CM

## 2025-04-15 DIAGNOSIS — R25.2 SPASTICITY: Primary | ICD-10-CM

## 2025-04-15 PROCEDURE — 99203 OFFICE O/P NEW LOW 30 MIN: CPT | Performed by: PSYCHIATRY & NEUROLOGY

## 2025-04-15 RX ORDER — PREGABALIN 100 MG/1
100 CAPSULE ORAL 2 TIMES DAILY
Qty: 60 CAPSULE | Refills: 5 | Status: SHIPPED | OUTPATIENT
Start: 2025-04-15

## 2025-04-15 RX ORDER — PREGABALIN 75 MG/1
75 CAPSULE ORAL 3 TIMES DAILY
COMMUNITY
Start: 2025-03-31 | End: 2025-04-15 | Stop reason: SDUPTHER

## 2025-04-17 ENCOUNTER — PATIENT ROUNDING (BHMG ONLY) (OUTPATIENT)
Dept: NEUROLOGY | Facility: CLINIC | Age: 45
End: 2025-04-17
Payer: COMMERCIAL

## (undated) DEVICE — GLV SURG SENSICARE PI LF PF 8 GRN STRL

## (undated) DEVICE — ANTIBACTERIAL UNDYED BRAIDED (POLYGLACTIN 910), SYNTHETIC ABSORBABLE SUTURE: Brand: COATED VICRYL

## (undated) DEVICE — DISPOSABLE TOURNIQUET CUFF SINGLE BLADDER, SINGLE PORT AND QUICK CONNECT CONNECTOR: Brand: COLOR CUFF

## (undated) DEVICE — SPNG GZ WOVN 4X4IN 12PLY 10/BX STRL

## (undated) DEVICE — TOWEL,OR,DSP,ST,BLUE,STD,4/PK,20PK/CS: Brand: MEDLINE

## (undated) DEVICE — SUT VIC 0 CT1 36IN J946H

## (undated) DEVICE — SEAL HYSTERSCOPE/OUTFLOW CHANNEL MYOSURE

## (undated) DEVICE — SUT MNCRYL 3/0 SH 27 IN Y416H

## (undated) DEVICE — MEDI-VAC YANKAUER SUCTION HANDLE W/BULBOUS TIP: Brand: CARDINAL HEALTH

## (undated) DEVICE — GLV SURG BIOGEL SENSR LTX PF SZ7.5

## (undated) DEVICE — GLV SURG BIOGEL LTX PF 6

## (undated) DEVICE — PAD SANI MAXI W/ADHS SNG WRP 11IN

## (undated) DEVICE — STRAP STIRUP SLP RNG 19X3.5IN DISP

## (undated) DEVICE — PENCL E/S HNDSWCH ROCKR CB

## (undated) DEVICE — PK ORTHO MINOR TOWER 40

## (undated) DEVICE — DEV TISS REMOV MYOSURE REACH

## (undated) DEVICE — DRAPE,REIN 53X77,STERILE: Brand: MEDLINE

## (undated) DEVICE — ADHS SKIN SURG TISS VISC PREMIERPRO EXOFIN HI/VISC FAST/DRY

## (undated) DEVICE — DISPOSABLE BIPOLAR FORCEPS 4" (10.2CM) JEWELERS, STRAIGHT 0.4MM TIP AND 12 FT. (3.6M) CABLE: Brand: KIRWAN

## (undated) DEVICE — SUT SILK 2/0 FS BLK 18IN 685G

## (undated) DEVICE — TBG PENCL TELESCP MEGADYNE SMOKE EVAC 10FT

## (undated) DEVICE — LOU D & C HYSTEROSCOPY: Brand: MEDLINE INDUSTRIES, INC.

## (undated) DEVICE — SOL IRR NACL 0.9PCT 3000ML

## (undated) DEVICE — TRAP FLD MINIVAC MEGADYNE 100ML

## (undated) DEVICE — APPL CHLORAPREP HI/LITE 26ML ORNG

## (undated) DEVICE — GLV SURG SENSICARE POLYISPRN W/ALOE PF LF 6.5 GRN STRL

## (undated) DEVICE — DRAPE,U/ SHT,SPLIT,PLAS,STERIL: Brand: MEDLINE

## (undated) DEVICE — NDL SPINE 20G 3 1/2 YEL STRL 1P/U

## (undated) DEVICE — INTENDED FOR TISSUE SEPARATION, AND OTHER PROCEDURES THAT REQUIRE A SHARP SURGICAL BLADE TO PUNCTURE OR CUT.: Brand: BARD-PARKER ® CARBON RIB-BACK BLADES

## (undated) DEVICE — VAGINAL PACKING: Brand: DEROYAL

## (undated) DEVICE — DRSNG SURESITE WNDW 2.38X2.75

## (undated) DEVICE — SUT PROLN 3/0 FS2 18IN 8665G

## (undated) DEVICE — SOL ISO/ALC 70PCT 4OZ

## (undated) DEVICE — DRAPE,UNDERBUTTOCKS,PCH,STERILE: Brand: MEDLINE

## (undated) DEVICE — STCKNT IMPERV 12IN STRL

## (undated) DEVICE — DRSNG SLVR/ANTIBAC PRIMASEAL POST/OP ADHS 3.5X10IN

## (undated) DEVICE — ELECTRD BALL EZ CLN STD 5IN

## (undated) DEVICE — PAD,ABDOMINAL,8"X10",ST,LF: Brand: MEDLINE